# Patient Record
Sex: MALE | Race: WHITE | NOT HISPANIC OR LATINO | Employment: OTHER | URBAN - METROPOLITAN AREA
[De-identification: names, ages, dates, MRNs, and addresses within clinical notes are randomized per-mention and may not be internally consistent; named-entity substitution may affect disease eponyms.]

---

## 2018-01-01 ENCOUNTER — APPOINTMENT (OUTPATIENT)
Dept: RADIOLOGY | Facility: MEDICAL CENTER | Age: 83
DRG: 064 | End: 2018-01-01
Attending: INTERNAL MEDICINE
Payer: MEDICARE

## 2018-01-01 ENCOUNTER — APPOINTMENT (OUTPATIENT)
Dept: RADIOLOGY | Facility: MEDICAL CENTER | Age: 83
DRG: 064 | End: 2018-01-01
Attending: EMERGENCY MEDICINE
Payer: MEDICARE

## 2018-01-01 ENCOUNTER — HOSPICE ADMISSION (OUTPATIENT)
Dept: HOSPICE | Facility: HOSPICE | Age: 83
End: 2018-01-01
Payer: MEDICARE

## 2018-01-01 ENCOUNTER — APPOINTMENT (OUTPATIENT)
Dept: RADIOLOGY | Facility: MEDICAL CENTER | Age: 83
DRG: 064 | End: 2018-01-01
Attending: HOSPITALIST
Payer: MEDICARE

## 2018-01-01 ENCOUNTER — RESOLUTE PROFESSIONAL BILLING HOSPITAL PROF FEE (OUTPATIENT)
Dept: HOSPITALIST | Facility: MEDICAL CENTER | Age: 83
End: 2018-01-01
Payer: MEDICARE

## 2018-01-01 ENCOUNTER — HOSPITAL ENCOUNTER (INPATIENT)
Facility: MEDICAL CENTER | Age: 83
LOS: 16 days | DRG: 064 | End: 2018-03-11
Attending: EMERGENCY MEDICINE | Admitting: HOSPITALIST
Payer: MEDICARE

## 2018-01-01 VITALS
BODY MASS INDEX: 26.4 KG/M2 | TEMPERATURE: 96.8 F | DIASTOLIC BLOOD PRESSURE: 62 MMHG | WEIGHT: 194.89 LBS | OXYGEN SATURATION: 76 % | RESPIRATION RATE: 18 BRPM | HEIGHT: 72 IN | SYSTOLIC BLOOD PRESSURE: 139 MMHG | HEART RATE: 115 BPM

## 2018-01-01 DIAGNOSIS — I63.00 CEREBROVASCULAR ACCIDENT (CVA) DUE TO THROMBOSIS OF PRECEREBRAL ARTERY (HCC): ICD-10-CM

## 2018-01-01 LAB
ALBUMIN SERPL BCP-MCNC: 3.1 G/DL (ref 3.2–4.9)
ALBUMIN SERPL BCP-MCNC: 3.3 G/DL (ref 3.2–4.9)
ALBUMIN SERPL BCP-MCNC: 3.5 G/DL (ref 3.2–4.9)
ALBUMIN SERPL BCP-MCNC: 4.1 G/DL (ref 3.2–4.9)
ALBUMIN/GLOB SERPL: 0.9 G/DL
ALBUMIN/GLOB SERPL: 1.2 G/DL
ALBUMIN/GLOB SERPL: 1.2 G/DL
ALBUMIN/GLOB SERPL: 1.4 G/DL
ALP SERPL-CCNC: 48 U/L (ref 30–99)
ALP SERPL-CCNC: 56 U/L (ref 30–99)
ALP SERPL-CCNC: 71 U/L (ref 30–99)
ALP SERPL-CCNC: 90 U/L (ref 30–99)
ALT SERPL-CCNC: 11 U/L (ref 2–50)
ALT SERPL-CCNC: 8 U/L (ref 2–50)
ALT SERPL-CCNC: 85 U/L (ref 2–50)
ALT SERPL-CCNC: 9 U/L (ref 2–50)
ANION GAP SERPL CALC-SCNC: 7 MMOL/L (ref 0–11.9)
ANION GAP SERPL CALC-SCNC: 9 MMOL/L (ref 0–11.9)
APPEARANCE UR: ABNORMAL
AST SERPL-CCNC: 17 U/L (ref 12–45)
AST SERPL-CCNC: 27 U/L (ref 12–45)
AST SERPL-CCNC: 35 U/L (ref 12–45)
AST SERPL-CCNC: 73 U/L (ref 12–45)
BACTERIA #/AREA URNS HPF: NEGATIVE /HPF
BACTERIA UR CULT: NORMAL
BASOPHILS # BLD AUTO: 0.2 % (ref 0–1.8)
BASOPHILS # BLD AUTO: 0.2 % (ref 0–1.8)
BASOPHILS # BLD AUTO: 0.3 % (ref 0–1.8)
BASOPHILS # BLD AUTO: 0.5 % (ref 0–1.8)
BASOPHILS # BLD: 0.02 K/UL (ref 0–0.12)
BASOPHILS # BLD: 0.02 K/UL (ref 0–0.12)
BASOPHILS # BLD: 0.03 K/UL (ref 0–0.12)
BASOPHILS # BLD: 0.04 K/UL (ref 0–0.12)
BILIRUB SERPL-MCNC: 0.7 MG/DL (ref 0.1–1.5)
BILIRUB SERPL-MCNC: 0.8 MG/DL (ref 0.1–1.5)
BILIRUB SERPL-MCNC: 0.8 MG/DL (ref 0.1–1.5)
BILIRUB SERPL-MCNC: 1.1 MG/DL (ref 0.1–1.5)
BILIRUB UR QL STRIP.AUTO: NEGATIVE
BUN SERPL-MCNC: 15 MG/DL (ref 8–22)
BUN SERPL-MCNC: 15 MG/DL (ref 8–22)
BUN SERPL-MCNC: 18 MG/DL (ref 8–22)
BUN SERPL-MCNC: 20 MG/DL (ref 8–22)
CALCIUM SERPL-MCNC: 8.7 MG/DL (ref 8.5–10.5)
CALCIUM SERPL-MCNC: 9 MG/DL (ref 8.5–10.5)
CALCIUM SERPL-MCNC: 9.1 MG/DL (ref 8.5–10.5)
CALCIUM SERPL-MCNC: 9.3 MG/DL (ref 8.5–10.5)
CHLORIDE SERPL-SCNC: 102 MMOL/L (ref 96–112)
CHLORIDE SERPL-SCNC: 103 MMOL/L (ref 96–112)
CHLORIDE SERPL-SCNC: 105 MMOL/L (ref 96–112)
CHLORIDE SERPL-SCNC: 106 MMOL/L (ref 96–112)
CHOLEST SERPL-MCNC: 126 MG/DL (ref 100–199)
CK SERPL-CCNC: 229 U/L (ref 0–154)
CO2 SERPL-SCNC: 22 MMOL/L (ref 20–33)
CO2 SERPL-SCNC: 23 MMOL/L (ref 20–33)
CO2 SERPL-SCNC: 24 MMOL/L (ref 20–33)
CO2 SERPL-SCNC: 27 MMOL/L (ref 20–33)
COLOR UR: ABNORMAL
CREAT SERPL-MCNC: 0.7 MG/DL (ref 0.5–1.4)
CREAT SERPL-MCNC: 0.71 MG/DL (ref 0.5–1.4)
CREAT SERPL-MCNC: 0.78 MG/DL (ref 0.5–1.4)
CREAT SERPL-MCNC: 1.14 MG/DL (ref 0.5–1.4)
CRP SERPL HS-MCNC: 20.38 MG/DL (ref 0–0.75)
EOSINOPHIL # BLD AUTO: 0 K/UL (ref 0–0.51)
EOSINOPHIL # BLD AUTO: 0.01 K/UL (ref 0–0.51)
EOSINOPHIL # BLD AUTO: 0.01 K/UL (ref 0–0.51)
EOSINOPHIL # BLD AUTO: 0.09 K/UL (ref 0–0.51)
EOSINOPHIL NFR BLD: 0 % (ref 0–6.9)
EOSINOPHIL NFR BLD: 0.1 % (ref 0–6.9)
EOSINOPHIL NFR BLD: 0.1 % (ref 0–6.9)
EOSINOPHIL NFR BLD: 1.2 % (ref 0–6.9)
EPI CELLS #/AREA URNS HPF: NEGATIVE /HPF
ERYTHROCYTE [DISTWIDTH] IN BLOOD BY AUTOMATED COUNT: 46.3 FL (ref 35.9–50)
ERYTHROCYTE [DISTWIDTH] IN BLOOD BY AUTOMATED COUNT: 47.7 FL (ref 35.9–50)
ERYTHROCYTE [DISTWIDTH] IN BLOOD BY AUTOMATED COUNT: 48.5 FL (ref 35.9–50)
ERYTHROCYTE [DISTWIDTH] IN BLOOD BY AUTOMATED COUNT: 49.6 FL (ref 35.9–50)
EST. AVERAGE GLUCOSE BLD GHB EST-MCNC: 111 MG/DL
GLOBULIN SER CALC-MCNC: 2.8 G/DL (ref 1.9–3.5)
GLOBULIN SER CALC-MCNC: 3 G/DL (ref 1.9–3.5)
GLOBULIN SER CALC-MCNC: 3 G/DL (ref 1.9–3.5)
GLOBULIN SER CALC-MCNC: 3.4 G/DL (ref 1.9–3.5)
GLUCOSE SERPL-MCNC: 100 MG/DL (ref 65–99)
GLUCOSE SERPL-MCNC: 106 MG/DL (ref 65–99)
GLUCOSE SERPL-MCNC: 120 MG/DL (ref 65–99)
GLUCOSE SERPL-MCNC: 142 MG/DL (ref 65–99)
GLUCOSE UR STRIP.AUTO-MCNC: NEGATIVE MG/DL
HBA1C MFR BLD: 5.5 % (ref 0–5.6)
HCT VFR BLD AUTO: 35.9 % (ref 42–52)
HCT VFR BLD AUTO: 36.8 % (ref 42–52)
HCT VFR BLD AUTO: 37.4 % (ref 42–52)
HCT VFR BLD AUTO: 41.5 % (ref 42–52)
HDLC SERPL-MCNC: 43 MG/DL
HGB BLD-MCNC: 12 G/DL (ref 14–18)
HGB BLD-MCNC: 12.3 G/DL (ref 14–18)
HGB BLD-MCNC: 12.5 G/DL (ref 14–18)
HGB BLD-MCNC: 14.2 G/DL (ref 14–18)
HYALINE CASTS #/AREA URNS LPF: ABNORMAL /LPF
IMM GRANULOCYTES # BLD AUTO: 0.02 K/UL (ref 0–0.11)
IMM GRANULOCYTES # BLD AUTO: 0.02 K/UL (ref 0–0.11)
IMM GRANULOCYTES # BLD AUTO: 0.03 K/UL (ref 0–0.11)
IMM GRANULOCYTES # BLD AUTO: 0.03 K/UL (ref 0–0.11)
IMM GRANULOCYTES NFR BLD AUTO: 0.2 % (ref 0–0.9)
IMM GRANULOCYTES NFR BLD AUTO: 0.3 % (ref 0–0.9)
KETONES UR STRIP.AUTO-MCNC: NEGATIVE MG/DL
LACTATE BLD-SCNC: 2.3 MMOL/L (ref 0.5–2)
LDLC SERPL CALC-MCNC: 66 MG/DL
LEUKOCYTE ESTERASE UR QL STRIP.AUTO: ABNORMAL
LV EJECT FRACT  99904: 75
LV EJECT FRACT MOD 2C 99903: 69.17
LV EJECT FRACT MOD 4C 99902: 62.49
LV EJECT FRACT MOD BP 99901: 67.44
LYMPHOCYTES # BLD AUTO: 1.28 K/UL (ref 1–4.8)
LYMPHOCYTES # BLD AUTO: 1.41 K/UL (ref 1–4.8)
LYMPHOCYTES # BLD AUTO: 1.47 K/UL (ref 1–4.8)
LYMPHOCYTES # BLD AUTO: 1.59 K/UL (ref 1–4.8)
LYMPHOCYTES NFR BLD: 14.4 % (ref 22–41)
LYMPHOCYTES NFR BLD: 14.8 % (ref 22–41)
LYMPHOCYTES NFR BLD: 15.9 % (ref 22–41)
LYMPHOCYTES NFR BLD: 19.3 % (ref 22–41)
MAGNESIUM SERPL-MCNC: 1.9 MG/DL (ref 1.5–2.5)
MAGNESIUM SERPL-MCNC: 2 MG/DL (ref 1.5–2.5)
MCH RBC QN AUTO: 32.6 PG (ref 27–33)
MCH RBC QN AUTO: 32.7 PG (ref 27–33)
MCH RBC QN AUTO: 32.8 PG (ref 27–33)
MCH RBC QN AUTO: 33.3 PG (ref 27–33)
MCHC RBC AUTO-ENTMCNC: 33.4 G/DL (ref 33.7–35.3)
MCHC RBC AUTO-ENTMCNC: 34.2 G/DL (ref 33.7–35.3)
MCV RBC AUTO: 97.2 FL (ref 81.4–97.8)
MCV RBC AUTO: 97.6 FL (ref 81.4–97.8)
MCV RBC AUTO: 97.9 FL (ref 81.4–97.8)
MCV RBC AUTO: 98.1 FL (ref 81.4–97.8)
MICRO URNS: ABNORMAL
MONOCYTES # BLD AUTO: 0.7 K/UL (ref 0–0.85)
MONOCYTES # BLD AUTO: 0.72 K/UL (ref 0–0.85)
MONOCYTES # BLD AUTO: 0.89 K/UL (ref 0–0.85)
MONOCYTES # BLD AUTO: 0.95 K/UL (ref 0–0.85)
MONOCYTES NFR BLD AUTO: 10.7 % (ref 0–13.4)
MONOCYTES NFR BLD AUTO: 6.5 % (ref 0–13.4)
MONOCYTES NFR BLD AUTO: 9.6 % (ref 0–13.4)
MONOCYTES NFR BLD AUTO: 9.9 % (ref 0–13.4)
NEUTROPHILS # BLD AUTO: 5.01 K/UL (ref 1.82–7.42)
NEUTROPHILS # BLD AUTO: 6.58 K/UL (ref 1.82–7.42)
NEUTROPHILS # BLD AUTO: 6.84 K/UL (ref 1.82–7.42)
NEUTROPHILS # BLD AUTO: 8.41 K/UL (ref 1.82–7.42)
NEUTROPHILS NFR BLD: 68.8 % (ref 44–72)
NEUTROPHILS NFR BLD: 73.9 % (ref 44–72)
NEUTROPHILS NFR BLD: 74.4 % (ref 44–72)
NEUTROPHILS NFR BLD: 78.1 % (ref 44–72)
NITRITE UR QL STRIP.AUTO: NEGATIVE
NRBC # BLD AUTO: 0 K/UL
NRBC BLD-RTO: 0 /100 WBC
PH UR STRIP.AUTO: 5 [PH]
PHOSPHATE SERPL-MCNC: 3.3 MG/DL (ref 2.5–4.5)
PLATELET # BLD AUTO: 187 K/UL (ref 164–446)
PLATELET # BLD AUTO: 195 K/UL (ref 164–446)
PLATELET # BLD AUTO: 225 K/UL (ref 164–446)
PLATELET # BLD AUTO: 229 K/UL (ref 164–446)
PMV BLD AUTO: 8.7 FL (ref 9–12.9)
PMV BLD AUTO: 8.9 FL (ref 9–12.9)
PMV BLD AUTO: 8.9 FL (ref 9–12.9)
PMV BLD AUTO: 9.3 FL (ref 9–12.9)
POTASSIUM SERPL-SCNC: 3.8 MMOL/L (ref 3.6–5.5)
POTASSIUM SERPL-SCNC: 4 MMOL/L (ref 3.6–5.5)
POTASSIUM SERPL-SCNC: 4 MMOL/L (ref 3.6–5.5)
POTASSIUM SERPL-SCNC: 4.3 MMOL/L (ref 3.6–5.5)
PREALB SERPL-MCNC: 6 MG/DL (ref 18–38)
PROT SERPL-MCNC: 6.1 G/DL (ref 6–8.2)
PROT SERPL-MCNC: 6.5 G/DL (ref 6–8.2)
PROT SERPL-MCNC: 6.5 G/DL (ref 6–8.2)
PROT SERPL-MCNC: 7.1 G/DL (ref 6–8.2)
PROT UR QL STRIP: 300 MG/DL
RBC # BLD AUTO: 3.66 M/UL (ref 4.7–6.1)
RBC # BLD AUTO: 3.77 M/UL (ref 4.7–6.1)
RBC # BLD AUTO: 3.82 M/UL (ref 4.7–6.1)
RBC # BLD AUTO: 4.27 M/UL (ref 4.7–6.1)
RBC # URNS HPF: >150 /HPF
RBC UR QL AUTO: ABNORMAL
SIGNIFICANT IND 70042: NORMAL
SITE SITE: NORMAL
SODIUM SERPL-SCNC: 133 MMOL/L (ref 135–145)
SODIUM SERPL-SCNC: 135 MMOL/L (ref 135–145)
SODIUM SERPL-SCNC: 137 MMOL/L (ref 135–145)
SODIUM SERPL-SCNC: 137 MMOL/L (ref 135–145)
SOURCE SOURCE: NORMAL
SP GR UR STRIP.AUTO: 1.02
TRIGL SERPL-MCNC: 83 MG/DL (ref 0–149)
TSH SERPL DL<=0.005 MIU/L-ACNC: 0.94 UIU/ML (ref 0.38–5.33)
UROBILINOGEN UR STRIP.AUTO-MCNC: 1 MG/DL
WBC # BLD AUTO: 10.8 K/UL (ref 4.8–10.8)
WBC # BLD AUTO: 7.3 K/UL (ref 4.8–10.8)
WBC # BLD AUTO: 8.9 K/UL (ref 4.8–10.8)
WBC # BLD AUTO: 9.3 K/UL (ref 4.8–10.8)
WBC #/AREA URNS HPF: ABNORMAL /HPF

## 2018-01-01 PROCEDURE — 87086 URINE CULTURE/COLONY COUNT: CPT

## 2018-01-01 PROCEDURE — 36415 COLL VENOUS BLD VENIPUNCTURE: CPT

## 2018-01-01 PROCEDURE — A9270 NON-COVERED ITEM OR SERVICE: HCPCS | Performed by: HOSPITALIST

## 2018-01-01 PROCEDURE — 82550 ASSAY OF CK (CPK): CPT

## 2018-01-01 PROCEDURE — 700111 HCHG RX REV CODE 636 W/ 250 OVERRIDE (IP): Performed by: INTERNAL MEDICINE

## 2018-01-01 PROCEDURE — 99232 SBSQ HOSP IP/OBS MODERATE 35: CPT | Performed by: HOSPITALIST

## 2018-01-01 PROCEDURE — 97163 PT EVAL HIGH COMPLEX 45 MIN: CPT

## 2018-01-01 PROCEDURE — 97167 OT EVAL HIGH COMPLEX 60 MIN: CPT

## 2018-01-01 PROCEDURE — 83735 ASSAY OF MAGNESIUM: CPT

## 2018-01-01 PROCEDURE — G8979 MOBILITY GOAL STATUS: HCPCS | Mod: CK

## 2018-01-01 PROCEDURE — 99231 SBSQ HOSP IP/OBS SF/LOW 25: CPT | Performed by: INTERNAL MEDICINE

## 2018-01-01 PROCEDURE — 700105 HCHG RX REV CODE 258: Performed by: NURSE PRACTITIONER

## 2018-01-01 PROCEDURE — 51702 INSERT TEMP BLADDER CATH: CPT

## 2018-01-01 PROCEDURE — 80053 COMPREHEN METABOLIC PANEL: CPT

## 2018-01-01 PROCEDURE — 700102 HCHG RX REV CODE 250 W/ 637 OVERRIDE(OP): Performed by: HOSPITALIST

## 2018-01-01 PROCEDURE — 81001 URINALYSIS AUTO W/SCOPE: CPT

## 2018-01-01 PROCEDURE — 770001 HCHG ROOM/CARE - MED/SURG/GYN PRIV*

## 2018-01-01 PROCEDURE — G8987 SELF CARE CURRENT STATUS: HCPCS | Mod: CN

## 2018-01-01 PROCEDURE — 84100 ASSAY OF PHOSPHORUS: CPT

## 2018-01-01 PROCEDURE — 84443 ASSAY THYROID STIM HORMONE: CPT

## 2018-01-01 PROCEDURE — 99232 SBSQ HOSP IP/OBS MODERATE 35: CPT | Performed by: INTERNAL MEDICINE

## 2018-01-01 PROCEDURE — 99223 1ST HOSP IP/OBS HIGH 75: CPT | Mod: AI | Performed by: HOSPITALIST

## 2018-01-01 PROCEDURE — 83036 HEMOGLOBIN GLYCOSYLATED A1C: CPT

## 2018-01-01 PROCEDURE — 85025 COMPLETE CBC W/AUTO DIFF WBC: CPT

## 2018-01-01 PROCEDURE — 99497 ADVNCD CARE PLAN 30 MIN: CPT | Performed by: INTERNAL MEDICINE

## 2018-01-01 PROCEDURE — 92610 EVALUATE SWALLOWING FUNCTION: CPT

## 2018-01-01 PROCEDURE — 93306 TTE W/DOPPLER COMPLETE: CPT | Mod: 26 | Performed by: INTERNAL MEDICINE

## 2018-01-01 PROCEDURE — 95951 EEG: CPT | Mod: 52 | Performed by: PSYCHIATRY & NEUROLOGY

## 2018-01-01 PROCEDURE — 84134 ASSAY OF PREALBUMIN: CPT

## 2018-01-01 PROCEDURE — 770020 HCHG ROOM/CARE - TELE (206)

## 2018-01-01 PROCEDURE — 302255 BARRIER CREAM MOISTURE BAZA PROTECT (ZINC) 5OZ: Performed by: HOSPITALIST

## 2018-01-01 PROCEDURE — 302128 INFUSION PUMP W/POLE: Performed by: HOSPITALIST

## 2018-01-01 PROCEDURE — 302146: Performed by: HOSPITALIST

## 2018-01-01 PROCEDURE — A4357 BEDSIDE DRAINAGE BAG: HCPCS | Performed by: HOSPITALIST

## 2018-01-01 PROCEDURE — 71045 X-RAY EXAM CHEST 1 VIEW: CPT

## 2018-01-01 PROCEDURE — 3E0G76Z INTRODUCTION OF NUTRITIONAL SUBSTANCE INTO UPPER GI, VIA NATURAL OR ARTIFICIAL OPENING: ICD-10-PCS | Performed by: HOSPITALIST

## 2018-01-01 PROCEDURE — G8996 SWALLOW CURRENT STATUS: HCPCS | Mod: CN

## 2018-01-01 PROCEDURE — 99231 SBSQ HOSP IP/OBS SF/LOW 25: CPT | Performed by: HOSPITALIST

## 2018-01-01 PROCEDURE — 83605 ASSAY OF LACTIC ACID: CPT

## 2018-01-01 PROCEDURE — 86140 C-REACTIVE PROTEIN: CPT

## 2018-01-01 PROCEDURE — 304561 HCHG STAT O2

## 2018-01-01 PROCEDURE — 93306 TTE W/DOPPLER COMPLETE: CPT

## 2018-01-01 PROCEDURE — G8989 SELF CARE D/C STATUS: HCPCS | Mod: CN

## 2018-01-01 PROCEDURE — G8988 SELF CARE GOAL STATUS: HCPCS | Mod: CK

## 2018-01-01 PROCEDURE — 93880 EXTRACRANIAL BILAT STUDY: CPT | Mod: 26 | Performed by: SURGERY

## 2018-01-01 PROCEDURE — 99233 SBSQ HOSP IP/OBS HIGH 50: CPT | Mod: 25 | Performed by: INTERNAL MEDICINE

## 2018-01-01 PROCEDURE — 99285 EMERGENCY DEPT VISIT HI MDM: CPT

## 2018-01-01 PROCEDURE — 80061 LIPID PANEL: CPT

## 2018-01-01 PROCEDURE — G8980 MOBILITY D/C STATUS: HCPCS | Mod: CN

## 2018-01-01 PROCEDURE — 92523 SPEECH SOUND LANG COMPREHEN: CPT

## 2018-01-01 PROCEDURE — G8978 MOBILITY CURRENT STATUS: HCPCS | Mod: CN

## 2018-01-01 PROCEDURE — G8997 SWALLOW GOAL STATUS: HCPCS | Mod: CM

## 2018-01-01 PROCEDURE — 93880 EXTRACRANIAL BILAT STUDY: CPT

## 2018-01-01 PROCEDURE — 70551 MRI BRAIN STEM W/O DYE: CPT

## 2018-01-01 PROCEDURE — 303105 HCHG CATHETER EXTRA

## 2018-01-01 PROCEDURE — 51798 US URINE CAPACITY MEASURE: CPT

## 2018-01-01 RX ORDER — MORPHINE SULFATE 10 MG/ML
10 INJECTION, SOLUTION INTRAMUSCULAR; INTRAVENOUS
Status: DISCONTINUED | OUTPATIENT
Start: 2018-01-01 | End: 2018-01-01 | Stop reason: HOSPADM

## 2018-01-01 RX ORDER — AMOXICILLIN 250 MG
2 CAPSULE ORAL 2 TIMES DAILY
Status: DISCONTINUED | OUTPATIENT
Start: 2018-01-01 | End: 2018-01-01

## 2018-01-01 RX ORDER — SODIUM CHLORIDE 9 MG/ML
INJECTION, SOLUTION INTRAVENOUS CONTINUOUS
Status: DISCONTINUED | OUTPATIENT
Start: 2018-01-01 | End: 2018-01-01

## 2018-01-01 RX ORDER — LOSARTAN POTASSIUM 50 MG/1
100 TABLET ORAL DAILY
Status: ON HOLD | COMMUNITY
End: 2018-01-01

## 2018-01-01 RX ORDER — ASPIRIN 325 MG
325 TABLET ORAL DAILY
Status: DISCONTINUED | OUTPATIENT
Start: 2018-01-01 | End: 2018-01-01

## 2018-01-01 RX ORDER — BISACODYL 10 MG
10 SUPPOSITORY, RECTAL RECTAL
Status: DISCONTINUED | OUTPATIENT
Start: 2018-01-01 | End: 2018-01-01

## 2018-01-01 RX ORDER — ATORVASTATIN CALCIUM 20 MG/1
20 TABLET, FILM COATED ORAL NIGHTLY
Status: ON HOLD | COMMUNITY
End: 2018-01-01

## 2018-01-01 RX ORDER — ATORVASTATIN CALCIUM 40 MG/1
40 TABLET, FILM COATED ORAL EVERY EVENING
Status: DISCONTINUED | OUTPATIENT
Start: 2018-01-01 | End: 2018-01-01

## 2018-01-01 RX ORDER — POLYVINYL ALCOHOL 14 MG/ML
2 SOLUTION/ DROPS OPHTHALMIC EVERY 6 HOURS PRN
Status: DISCONTINUED | OUTPATIENT
Start: 2018-01-01 | End: 2018-01-01 | Stop reason: HOSPADM

## 2018-01-01 RX ORDER — ATORVASTATIN CALCIUM 20 MG/1
20 TABLET, FILM COATED ORAL
Status: DISCONTINUED | OUTPATIENT
Start: 2018-01-01 | End: 2018-01-01

## 2018-01-01 RX ORDER — CARVEDILOL 6.25 MG/1
3.12 TABLET ORAL 2 TIMES DAILY WITH MEALS
Status: DISCONTINUED | OUTPATIENT
Start: 2018-01-01 | End: 2018-01-01

## 2018-01-01 RX ORDER — HYDRALAZINE HYDROCHLORIDE 20 MG/ML
10 INJECTION INTRAMUSCULAR; INTRAVENOUS
Status: DISCONTINUED | OUTPATIENT
Start: 2018-01-01 | End: 2018-01-01

## 2018-01-01 RX ORDER — MORPHINE SULFATE 10 MG/ML
5 INJECTION, SOLUTION INTRAMUSCULAR; INTRAVENOUS
Status: DISCONTINUED | OUTPATIENT
Start: 2018-01-01 | End: 2018-01-01 | Stop reason: HOSPADM

## 2018-01-01 RX ORDER — LABETALOL HYDROCHLORIDE 5 MG/ML
10 INJECTION, SOLUTION INTRAVENOUS EVERY 4 HOURS PRN
Status: DISCONTINUED | OUTPATIENT
Start: 2018-01-01 | End: 2018-01-01

## 2018-01-01 RX ORDER — CARVEDILOL 3.12 MG/1
3.12 TABLET ORAL 2 TIMES DAILY WITH MEALS
COMMUNITY

## 2018-01-01 RX ORDER — ASPIRIN 81 MG/1
324 TABLET, CHEWABLE ORAL DAILY
Status: DISCONTINUED | OUTPATIENT
Start: 2018-01-01 | End: 2018-01-01

## 2018-01-01 RX ORDER — MORPHINE SULFATE 100 MG/5ML
10 SOLUTION ORAL
Status: DISCONTINUED | OUTPATIENT
Start: 2018-01-01 | End: 2018-01-01 | Stop reason: HOSPADM

## 2018-01-01 RX ORDER — LORAZEPAM 2 MG/ML
1 INJECTION INTRAMUSCULAR
Status: DISCONTINUED | OUTPATIENT
Start: 2018-01-01 | End: 2018-01-01 | Stop reason: HOSPADM

## 2018-01-01 RX ORDER — ASPIRIN 300 MG/1
300 SUPPOSITORY RECTAL DAILY
Status: DISCONTINUED | OUTPATIENT
Start: 2018-01-01 | End: 2018-01-01

## 2018-01-01 RX ORDER — POLYETHYLENE GLYCOL 3350 17 G/17G
1 POWDER, FOR SOLUTION ORAL
Status: DISCONTINUED | OUTPATIENT
Start: 2018-01-01 | End: 2018-01-01

## 2018-01-01 RX ORDER — LORAZEPAM 2 MG/ML
1 CONCENTRATE ORAL
Status: DISCONTINUED | OUTPATIENT
Start: 2018-01-01 | End: 2018-01-01 | Stop reason: HOSPADM

## 2018-01-01 RX ORDER — ATROPINE SULFATE 10 MG/ML
2 SOLUTION/ DROPS OPHTHALMIC EVERY 4 HOURS PRN
Status: DISCONTINUED | OUTPATIENT
Start: 2018-01-01 | End: 2018-01-01

## 2018-01-01 RX ORDER — LOSARTAN POTASSIUM 100 MG/1
100 TABLET ORAL DAILY
COMMUNITY

## 2018-01-01 RX ORDER — LOSARTAN POTASSIUM 50 MG/1
100 TABLET ORAL
Status: DISCONTINUED | OUTPATIENT
Start: 2018-01-01 | End: 2018-01-01

## 2018-01-01 RX ADMIN — STANDARDIZED SENNA CONCENTRATE AND DOCUSATE SODIUM 2 TABLET: 8.6; 5 TABLET, FILM COATED ORAL at 21:04

## 2018-01-01 RX ADMIN — SODIUM CHLORIDE: 9 INJECTION, SOLUTION INTRAVENOUS at 17:42

## 2018-01-01 RX ADMIN — ASPIRIN 324 MG: 81 TABLET, CHEWABLE ORAL at 07:54

## 2018-01-01 RX ADMIN — ATORVASTATIN CALCIUM 40 MG: 40 TABLET, FILM COATED ORAL at 21:04

## 2018-01-01 RX ADMIN — ATORVASTATIN CALCIUM 40 MG: 40 TABLET, FILM COATED ORAL at 20:18

## 2018-01-01 RX ADMIN — CARVEDILOL 3.12 MG: 6.25 TABLET, FILM COATED ORAL at 09:13

## 2018-01-01 RX ADMIN — SODIUM CHLORIDE: 9 INJECTION, SOLUTION INTRAVENOUS at 10:53

## 2018-01-01 RX ADMIN — CARVEDILOL 3.12 MG: 6.25 TABLET, FILM COATED ORAL at 10:12

## 2018-01-01 RX ADMIN — ASPIRIN 300 MG: 300 SUPPOSITORY RECTAL at 10:04

## 2018-01-01 RX ADMIN — LORAZEPAM 1 MG: 2 INJECTION INTRAMUSCULAR; INTRAVENOUS at 23:26

## 2018-01-01 RX ADMIN — MORPHINE SULFATE 10 MG: 10 INJECTION INTRAVENOUS at 23:26

## 2018-01-01 RX ADMIN — ATORVASTATIN CALCIUM 40 MG: 40 TABLET, FILM COATED ORAL at 21:16

## 2018-01-01 RX ADMIN — MORPHINE SULFATE 5 MG: 10 INJECTION INTRAVENOUS at 22:58

## 2018-01-01 RX ADMIN — CARVEDILOL 3.12 MG: 6.25 TABLET, FILM COATED ORAL at 07:54

## 2018-01-01 RX ADMIN — MORPHINE SULFATE 10 MG: 10 INJECTION INTRAVENOUS at 03:39

## 2018-01-01 RX ADMIN — CARVEDILOL 3.12 MG: 6.25 TABLET, FILM COATED ORAL at 18:20

## 2018-01-01 RX ADMIN — LOSARTAN POTASSIUM 100 MG: 50 TABLET, FILM COATED ORAL at 09:14

## 2018-01-01 RX ADMIN — SODIUM CHLORIDE: 9 INJECTION, SOLUTION INTRAVENOUS at 15:59

## 2018-01-01 RX ADMIN — LOSARTAN POTASSIUM 100 MG: 50 TABLET, FILM COATED ORAL at 08:42

## 2018-01-01 RX ADMIN — MORPHINE SULFATE 5 MG: 10 INJECTION INTRAVENOUS at 13:08

## 2018-01-01 RX ADMIN — LOSARTAN POTASSIUM 100 MG: 50 TABLET, FILM COATED ORAL at 10:12

## 2018-01-01 RX ADMIN — CARVEDILOL 3.12 MG: 6.25 TABLET, FILM COATED ORAL at 17:24

## 2018-01-01 RX ADMIN — LOSARTAN POTASSIUM 100 MG: 50 TABLET, FILM COATED ORAL at 07:54

## 2018-01-01 RX ADMIN — MORPHINE SULFATE 5 MG: 10 INJECTION INTRAVENOUS at 22:39

## 2018-01-01 RX ADMIN — ASPIRIN 324 MG: 81 TABLET, CHEWABLE ORAL at 07:40

## 2018-01-01 RX ADMIN — CARVEDILOL 3.12 MG: 6.25 TABLET, FILM COATED ORAL at 17:39

## 2018-01-01 RX ADMIN — MORPHINE SULFATE 5 MG: 10 INJECTION INTRAVENOUS at 17:30

## 2018-01-01 RX ADMIN — STANDARDIZED SENNA CONCENTRATE AND DOCUSATE SODIUM 2 TABLET: 8.6; 5 TABLET, FILM COATED ORAL at 07:40

## 2018-01-01 RX ADMIN — LOSARTAN POTASSIUM 100 MG: 50 TABLET, FILM COATED ORAL at 07:40

## 2018-01-01 RX ADMIN — STANDARDIZED SENNA CONCENTRATE AND DOCUSATE SODIUM 2 TABLET: 8.6; 5 TABLET, FILM COATED ORAL at 08:43

## 2018-01-01 RX ADMIN — STANDARDIZED SENNA CONCENTRATE AND DOCUSATE SODIUM 2 TABLET: 8.6; 5 TABLET, FILM COATED ORAL at 10:12

## 2018-01-01 RX ADMIN — LORAZEPAM 1 MG: 2 INJECTION INTRAMUSCULAR; INTRAVENOUS at 03:39

## 2018-01-01 RX ADMIN — MORPHINE SULFATE 5 MG: 10 INJECTION INTRAVENOUS at 15:35

## 2018-01-01 RX ADMIN — CARVEDILOL 3.12 MG: 6.25 TABLET, FILM COATED ORAL at 08:42

## 2018-01-01 RX ADMIN — ASPIRIN 324 MG: 81 TABLET, CHEWABLE ORAL at 09:13

## 2018-01-01 RX ADMIN — SODIUM CHLORIDE: 9 INJECTION, SOLUTION INTRAVENOUS at 08:01

## 2018-01-01 RX ADMIN — ASPIRIN 324 MG: 81 TABLET, CHEWABLE ORAL at 10:12

## 2018-01-01 RX ADMIN — ATORVASTATIN CALCIUM 40 MG: 40 TABLET, FILM COATED ORAL at 20:17

## 2018-01-01 RX ADMIN — CARVEDILOL 3.12 MG: 6.25 TABLET, FILM COATED ORAL at 17:36

## 2018-01-01 RX ADMIN — STANDARDIZED SENNA CONCENTRATE AND DOCUSATE SODIUM 2 TABLET: 8.6; 5 TABLET, FILM COATED ORAL at 07:54

## 2018-01-01 RX ADMIN — CARVEDILOL 3.12 MG: 6.25 TABLET, FILM COATED ORAL at 07:41

## 2018-01-01 RX ADMIN — ASPIRIN 325 MG: 325 TABLET ORAL at 08:43

## 2018-01-01 RX ADMIN — STANDARDIZED SENNA CONCENTRATE AND DOCUSATE SODIUM 2 TABLET: 8.6; 5 TABLET, FILM COATED ORAL at 20:17

## 2018-01-01 ASSESSMENT — ACTIVITIES OF DAILY LIVING (ADL): TOILETING: INDEPENDENT

## 2018-01-01 ASSESSMENT — COGNITIVE AND FUNCTIONAL STATUS - GENERAL
DRESSING REGULAR LOWER BODY CLOTHING: TOTAL
MOVING FROM LYING ON BACK TO SITTING ON SIDE OF FLAT BED: UNABLE
TURNING FROM BACK TO SIDE WHILE IN FLAT BAD: UNABLE
HELP NEEDED FOR BATHING: TOTAL
MOVING FROM LYING ON BACK TO SITTING ON SIDE OF FLAT BED: UNABLE
EATING MEALS: TOTAL
MOBILITY SCORE: 6
CLIMB 3 TO 5 STEPS WITH RAILING: TOTAL
CLIMB 3 TO 5 STEPS WITH RAILING: TOTAL
MOVING TO AND FROM BED TO CHAIR: UNABLE
DAILY ACTIVITIY SCORE: 6
SUGGESTED CMS G CODE MODIFIER DAILY ACTIVITY: CN
PERSONAL GROOMING: TOTAL
STANDING UP FROM CHAIR USING ARMS: TOTAL
DAILY ACTIVITIY SCORE: 6
SUGGESTED CMS G CODE MODIFIER MOBILITY: CN
SUGGESTED CMS G CODE MODIFIER DAILY ACTIVITY: CN
WALKING IN HOSPITAL ROOM: TOTAL
PERSONAL GROOMING: TOTAL
WALKING IN HOSPITAL ROOM: TOTAL
EATING MEALS: TOTAL
TOILETING: TOTAL
DRESSING REGULAR UPPER BODY CLOTHING: TOTAL
MOVING TO AND FROM BED TO CHAIR: UNABLE
SUGGESTED CMS G CODE MODIFIER MOBILITY: CN
HELP NEEDED FOR BATHING: TOTAL
DRESSING REGULAR UPPER BODY CLOTHING: TOTAL
MOBILITY SCORE: 6
DRESSING REGULAR LOWER BODY CLOTHING: TOTAL
STANDING UP FROM CHAIR USING ARMS: TOTAL
TOILETING: TOTAL
TURNING FROM BACK TO SIDE WHILE IN FLAT BAD: UNABLE

## 2018-01-01 ASSESSMENT — ENCOUNTER SYMPTOMS
BACK PAIN: 0
WEAKNESS: 1
BACK PAIN: 0
SPEECH CHANGE: 1
WEAKNESS: 1
EYE REDNESS: 1
EYE REDNESS: 1
BLOOD IN STOOL: 0
SPEECH CHANGE: 1
BLOOD IN STOOL: 0

## 2018-01-01 ASSESSMENT — PAIN SCALES - GENERAL
PAINLEVEL_OUTOF10: 0
PAINLEVEL_OUTOF10: 0
PAINLEVEL_OUTOF10: ASSUMED PAIN PRESENT
PAINLEVEL_OUTOF10: 0
PAINLEVEL_OUTOF10: 0
PAINLEVEL_OUTOF10: ASSUMED PAIN PRESENT
PAINLEVEL_OUTOF10: 0
PAINLEVEL_OUTOF10: ASSUMED PAIN PRESENT

## 2018-01-01 ASSESSMENT — GAIT ASSESSMENTS: GAIT LEVEL OF ASSIST: UNABLE TO PARTICIPATE

## 2018-02-23 PROBLEM — I63.9 STROKE (HCC): Status: ACTIVE | Noted: 2018-01-01

## 2018-02-23 NOTE — ED PROVIDER NOTES
ED Provider Note    CHIEF COMPLAINT  Chief Complaint   Patient presents with   • Possible Stroke     82 yo male bib [facility] as a transfer from [facility]. Per EMS, pt was last since at baseline at 11am by daughter, then was found to have neuro changes at 3pm and was brought to the hospital. Pt has hx of 2 strokes, hypertension, COPD. Pt given 1 duoneb in flight for bilateral crackles and expiratory wheezes. Pt on 3 L nasal cannula.  Pt unable to speak or follow commands at this time. Pt has stringer cath to down drain.       HPI  Nick Billings is a 83 y.o. male who presents as a transfer after the patient suffered from a suspected stroke. The patient does not speak there for all of his history was from his records from [facility]. According to the records the patient was last seen normal around 11 AM. The patient's daughter found him at noon with inability to speak and in distress. He is transferred to [facility] where he had a CT scan performed that showed a subacute stroke in the left parietal and temporal lobes. The patient was therefore transferred here for higher level of care. No other history could be obtained.    REVIEW OF SYSTEMS  See HPI for further details. Unobtainable.     PAST MEDICAL HISTORY  Past Medical History:   Diagnosis Date   • Chronic obstructive pulmonary disease    • Injury of upper arm, superficial, infected 2005    cellulits   • Prostate cancer (CMS-[facility])        SOCIAL HISTORY  Social History     Social History   • Marital status: Single     Spouse name: N/A   • Number of children: N/A   • Years of education: N/A     Social History Main Topics   • Smoking status: Current Every Day Smoker     Packs/day: 1.50     Types: Cigarettes   • Smokeless tobacco: Never Used   • Alcohol use Yes      Comment: 3-6 beers daily   • Drug use: No   • Sexual activity: Not on file     Other Topics Concern   • Not on file     Social History Narrative   • No  narrative on file           PHYSICAL EXAM  VITAL SIGNS: /86   Pulse (!) 109   Temp 37.4 °C (99.3 °F)   Resp 20   Ht 1.829 m (6')   Wt 95.7 kg (211 lb)   SpO2 93%   BMI 28.62 kg/m²   Constitutional: in acute distress, Non-toxic appearance.   HENT: Normocephalic, Atraumatic, tympanic membranes are intact and nonerythematous bilaterally, Oropharynx dry without exudates or erythema, Nose normal.   Eyes: PERRLA, the patient will not follow commands to test extraocular motor function.  Neck: Supple without meningismus  Lymphatic: No lymphadenopathy noted.   Cardiovascular: Slightly tachycardic heart rate, Normal rhythm, No murmurs, No rubs, No gallops.   Thorax & Lungs: Symmetrically diminished throughout, No respiratory distress, No wheezing, No chest tenderness.   Abdomen: Bowel sounds normal, Soft, No tenderness, no rebound, no guarding, no distention, No masses, No pulsatile masses.   Skin: Warm, Dry, No erythema, No rash.   Back: No tenderness, No CVA tenderness.   Extremities: Atraumatic with symmetric distal pulses, No edema, No tenderness, No cyanosis, No clubbing.   Neurologic: The patient seems to be alert but he will not speak nor answer questions, he will open his eyes but will not follow commands, he does withdrawal to pain to all 4 extremities and seems to be less brisk with redrawn with the left upper extremity. I cannot test sensory function.    COURSE & MEDICAL DECISION MAKING  Pertinent Labs & Imaging studies reviewed. (See chart for details)  This an 83-year-old gentleman who presents to the emergency department as a transfer after a cerebrovascular accident. The patient is outside of the window for any type of thrombolytic. There for the patient be admitted for medical management. I did review his workup including a CT scan, laboratory analysis, and EKG. The patient also presents with a durable power of . The patient does not want to be intubated or aggressively resuscitated if he  does not have a reversible condition. Based on this ischemic event I do not suspect this will be reversible. We'll keep the patient comfortable and we will not intubate the patient and he'll be admitted for observation and medical treatment of the suspected CVA.    FINAL IMPRESSION  1. Acute CVA     Disposition  The patient will be admitted in critical condition    Electronically signed by: Harjeet Berrios, 2/23/2018 1:40 AM

## 2018-02-23 NOTE — THERAPY
"Speech Language Therapy Evaluation completed to address speech, communication and cognition  Functional Status:  Limited cognitive-linguistic asessment completed due to severity of patient's aphasia. Patient was able to open eyes to verbal and tactile stimuli. However, he was unable to follow commands or indicate yes/no response with gestures, eye blink, or eye gaze. Patient presents with profound global aphasia. Auditory comprhension, verbal expression, nonverbal expression, and cognition appear profoundly impaired. Patient is unable to communicate basic wants and needs at this time.  Recommendations:  24 hour supervision/assistance with all ADLs; Speech therapy for low-level non-verbal communication, auditory comprehension, and cognition  Plan of Care: Will benefit from Speech Therapy 3 times per week  Post-Acute Therapy: Discharge to a transitional care facility for continued skilled therapy services.    See \"Rehab Therapy-Acute\" Patient Summary Report for complete documentation.   "

## 2018-02-23 NOTE — PROGRESS NOTES
Assumed care of patient.  Pt is non-verbal with severe expressive aphasia.  Pt does not follow commands.  Pt is NPO.  Tele in place.  SCDs in place. Q2Hr turns in place.  Barnett in place.  Call light within reach. Hourly rounding in place.

## 2018-02-23 NOTE — ASSESSMENT & PLAN NOTE
Discontinue blood pressure medications. Stop monitoring blood pressure  Patient is comfort care now

## 2018-02-23 NOTE — H&P
" Hospital Medicine History and Physical    Date of Service  2/23/2018    Chief Complaint  Chief Complaint   Patient presents with   • Possible Stroke     84 yo male bib [facility] as a transfer from [facility]. Per EMS, pt was last since at baseline at 11am by daughter, then was found to have neuro changes at 3pm and was brought to the hospital. Pt has hx of 2 strokes, hypertension, COPD. Pt given 1 duoneb in flight for bilateral crackles and expiratory wheezes. Pt on 3 L nasal cannula.  Pt unable to speak or follow commands at this time. Pt has stringer cath to down drain.       History of Presenting Illness  83 y.o. male who presented 2/23/2018 with transfer the patient suffered a suspected stroke. All history has to be obtained for [facility] record secondary to patient being nonverbal. There is limited history. Seen normal around 11 AM daughter found him with inability to speak and in distress. Transferred to [facility] where he had a CT scan performed that showed a subacute stroke in the left parietal and temporal lobes. Therefore transferred here for higher level of care.    Primary Care Physician  Maryann Sequeira, P.A.    Consultants  None    Code Status  DNR    Review of Systems  Review of Systems   Unable to perform ROS: Medical condition        Past Medical History  Past Medical History:   Diagnosis Date   • Injury of upper arm, superficial, infected 2005    cellulits   • Chronic obstructive pulmonary disease    • Prostate cancer (CMS-[facility])        Surgical History  Past Surgical History:   Procedure Laterality Date   • CLAVICLE ORIF     • KNEE ARTHROSCOPY         Medications  No current facility-administered medications on file prior to encounter.      No current outpatient prescriptions on file prior to encounter.       Family History  Family History   Problem Relation Age of Onset   • Heart Disease Father      \"heart attack\"       Social History  Social History "   Substance Use Topics   • Smoking status: Current Every Day Smoker     Packs/day: 1.50     Types: Cigarettes   • Smokeless tobacco: Never Used   • Alcohol use Yes      Comment: 3-6 beers daily       Allergies  No Known Allergies     Physical Exam  Laboratory   Hemodynamics  Temp (24hrs), Av.4 °C (99.3 °F), Min:37.4 °C (99.3 °F), Max:37.4 °C (99.3 °F)   Temperature: 37.4 °C (99.3 °F)  Pulse  Av  Min: 109  Max: 109 Heart Rate (Monitored): (!) 108  Blood Pressure : 142/86, NIBP: 136/59      Respiratory      Respiration: 20, Pulse Oximetry: 93 %        RUL Breath Sounds: Expiratory Wheezes, RML Breath Sounds: Coarse Crackles, RLL Breath Sounds: Coarse Crackles, MIRYAM Breath Sounds: Expiratory Wheezes, LLL Breath Sounds: Coarse Crackles    Physical Exam   Constitutional: He appears well-developed and well-nourished.   HENT:   Head: Normocephalic and atraumatic.   Eyes: Conjunctivae are normal. Pupils are equal, round, and reactive to light.   Neck: Normal range of motion. Neck supple. No JVD present.   Cardiovascular: Normal rate, regular rhythm, normal heart sounds and intact distal pulses.    No murmur heard.  Pulmonary/Chest: Effort normal and breath sounds normal. No respiratory distress. He exhibits no tenderness.   Abdominal: Soft. Bowel sounds are normal. He exhibits no distension. There is no tenderness.   Musculoskeletal: Normal range of motion. He exhibits no edema.   Neurological:   The patient seems to be alert but he will not speak nor answer questions, he will open his eyes but will not follow commands, he does withdrawal to pain to all 4 extremities and seems to be less brisk with redrawn with the left upper extremity. I cannot test sensory function.   Skin: Skin is warm and dry. No erythema.   Psychiatric: He has a normal mood and affect. His behavior is normal. Judgment and thought content normal.   Nursing note and vitals reviewed.              No results for input(s): ALTSGPT, ASTSGOT,  ALKPHOSPHAT, TBILIRUBIN, DBILIRUBIN, GAMMAGT, AMYLASE, LIPASE, ALB, PREALBUMIN, GLUCOSE in the last 72 hours.              No results found for: TROPONINI  Urinalysis:  No results found for: SPECGRAVITY, GLUCOSEUR, KETONES, NITRITE, WBCURINE, RBCURINE, BACTERIA, EPITHELCELL     Imaging  reviewed   Assessment/Plan     I anticipate this patient will require at least two midnights for appropriate medical management, necessitating inpatient admission.    Stroke (CMS-HCC)   Assessment & Plan    CT with left sided parietal and temporal lobe stroke  Patient with very poor prognosis for recover discussed case with patients daughter she wants to pursue further treatment as she believes this is what his wishes would be   I have ordered MRI, Echo, carotids   Lipid panel a1c   Statin, asa  PT/OT/ NPO until speech  Needs neurology consult   Will also consult palliative care  Patients POA daughter to be present in the morning        COPD (chronic obstructive pulmonary disease) (CMS-HCC)- (present on admission)   Assessment & Plan    Hx of not in acute exacerbation currently  resp care protocol ordered        Essential hypertension, benign- (present on admission)   Assessment & Plan    Hx of allow for permissive            VTE prophylaxis: scd

## 2018-02-23 NOTE — PROGRESS NOTES
2 RN skin assessment    Buttocks redness blanchable.    Otherwise skin intact.  No open wounds,drainage, or signs of infection.

## 2018-02-23 NOTE — PROGRESS NOTES
Assumed care of pt. Non verbal, does not follow commands. # Lo2 via NC,  on. Anibal changed per protocol. NPO per SLp, recommend cortrak, however waiting for family input. IV fluids ordered. MRI, ECHO, Carotid, PT, OT pending. POC undetermined at this time. Bed alarm on. Q2 turns. Call light in reach, bed in low position, will continue hourly rounding.

## 2018-02-23 NOTE — ED NOTES
X-ray at bedside completing chest xray. Pt's head of bed elevated. Pt appears to be clearing secretions at this time, coughing occasionally. VSS.

## 2018-02-23 NOTE — THERAPY
"Occupational Therapy Evaluation completed.   Functional Status: Pt is an 84 y/o male admitted with L CVA. Pt lethargic. Appears to track to auditory cues but he is inconsistent. He requires totalA x2 for bed mobility. Trace-dependent sitting balance eob, no righting reactions present. Pt did appear to be gripping therapists hands with digits 3-5 on each hand but unsure if on purpose. RUE with tone when ranging. Pt is currently requiring totalA for self cares at this time. Limited by weakness, fatigue, impaired balance, and impaired cognition which impacts independence in ADLs and functional mobility.   Plan of Care: Will benefit from Occupational Therapy 3 times per week  Discharge Recommendations:  Equipment: Will Continue to Assess for Equipment Needs. Discharge to a transitional care facility for continued skilled therapy services.    See \"Rehab Therapy-Acute\" Patient Summary Report for complete documentation.    "

## 2018-02-23 NOTE — ED TRIAGE NOTES
Chief Complaint   Patient presents with   • Possible Stroke     82 yo male bib [facility] as a transfer from [facility]. Per EMS, pt was last since at baseline at 11am by daughter, then was found to have neuro changes at 3pm and was brought to the hospital. Pt has hx of 2 strokes, hypertension, COPD. Pt given 1 duoneb in flight for bilateral crackles and expiratory wheezes. Pt on 3 L nasal cannula.  Pt unable to speak or follow commands at this time. Pt has stringer cath to down drain.     NIH stroke scale completed, score of 31, GCS of 6. Pt non-verbal at this time, opening eyes spontaneously but does not follow commands. Pt withdraws to pain in all extremities. Pt's response in right side appears to be stronger then left side. ERP to see.

## 2018-02-23 NOTE — CARE PLAN
Problem: Respiratory:  Goal: Respiratory status will improve    Intervention: Administer and titrate oxygen therapy  3 L O2 via NC,   In use      Problem: Skin Integrity  Goal: Risk for impaired skin integrity will decrease    Intervention: Assess risk factors for impaired skin integrity and/or pressure ulcers  Q2 turns in place

## 2018-02-23 NOTE — ASSESSMENT & PLAN NOTE
CT with left sided parietal and temporal lobe stroke  On comfort care  Awaiting acceptance with Tata botello

## 2018-02-23 NOTE — CARE PLAN
Problem: Venous Thromboembolism (VTW)/Deep Vein Thrombosis (DVT) Prevention:  Goal: Patient will participate in Venous Thrombosis (VTE)/Deep Vein Thrombosis (DVT)Prevention Measures  Outcome: PROGRESSING AS EXPECTED  SCDs in place

## 2018-02-23 NOTE — THERAPY
"  Speech Language Therapy Clinical Swallow Evaluation completed.  Functional Status: Bedside swallow evaluation completed today. Patient awake, but unable to follow simple 1-step commands or answer yes/no questions with gestures or eye blinks/gaze. Patient presents with symptoms of severe oral-pharyngeal dysphagia. Specifically, absent swallow trigger. Ice chip trials resulted in absent swallow with coughing. Unable to complete oral motor examination due to patient's severe aphasia. At this time, recommend NPO with alternate nutrition source as risk of aspiration is very high.   Recommendations - Diet: Diet / Liquid Recommendation: NPO, Pre-Feeding Trials with SLP Only                          Strategies: To Be Assessed                          Medication Administration: Medication Administration : Via Gastric Tube  Plan of Care: Will benefit from Speech Therapy 3 times per week  Post-Acute Therapy: Discharge to a transitional care facility for continued skilled therapy services.    See \"Rehab Therapy-Acute\" Patient Summary Report for complete documentation.   "

## 2018-02-24 NOTE — CONSULTS
DATE OF SERVICE:  02/23/2018    REQUESTING PHYSICIAN:  Jamel Shook MD    REASON FOR CONSULTATION:  Stroke.    HISTORY OF PRESENT ILLNESS:  The patient is an 83-year-old male with past   medical history significant for previous stroke with unknown residual symptoms   and history of chronic obstructive pulmonary disease and prostate cancer who   was transferred from an outside facility for evaluation of aphasia and   right-sided weakness.  Unfortunately, there is no family around and the   patient is nonverbal and I was not able to get a good history from him.    However, I discussed the case with Dr. Shook who reviewed his medical   record at the time of transfer and it appears he was seen normal yesterday   morning around 11:00 a.m. and was later on at 3:00 p.m. noted to have   difficulty talking with right-sided weakness.  He was initially taken to [facility] where he underwent a brain CT, which revealed evidence of   subacute infarct in the distribution of the left MCA.  The patient was found   not to be a candidate for TPA or thrombectomy.  He was subsequently   transferred to [facility] for higher level of care.    PAST MEDICAL HISTORY:  Significant for previous history of strokes with   unknown residual symptom, history of hypertension, history of COPD, and   history of prostate cancer.    MEDICATIONS:  Reviewed as per MAR.    ALLERGIES:  No known drug allergies.    SOCIAL HISTORY:  He is a current smoker, smokes 1-1/2 pack per day for many   years.  He drinks 3-6 beers daily.  No history of drug abuse.    FAMILY HISTORY:  Unable to obtain.  According to his medical record, his   father had myocardial infarction.    REVIEW OF SYSTEMS:  Unable to obtain.    PHYSICAL EXAMINATION:  VITAL SIGNS:  Today, heart rate 100, blood pressure 133/51, respirations 18,   O2 sat 93% on 3 liters nasal cannula, temperature 36.8.  NECK:  Supple.  No carotid bruit.  CARDIOVASCULAR:  Regular rate and  rhythm.  LUNGS:  Clear.  ABDOMEN:  Soft.  EXTREMITIES:  No cyanosis or clubbing.  NEUROLOGIC:  He is nonverbal and unfortunately, he does not follow command   likely due to his receptive aphasia.  His eyes are open.  There is slight   right facial weakness.  He blinks to visual threat in left eye, but there is   no reaction in right eye.  His pupils are equal and reactive.  Extraocular   movements cannot be tested.  His right upper extremity is flaccid.  He has   some muscle tone in left upper extremity, but again he does not move any of   his extremity.  He pulls his leg back to physical stimulation of both legs.    Sensation and coordination cannot be tested.  Deep tendon reflexes are   diffusely diminished.  Plantar reflexes are upgoing on the right, downgoing on   the left.    ASSESSMENT AND PLAN:  An 83-year-old male with what appeared to be large left   middle cerebral artery infarct.  The patient was not a candidate for   administration of tissue plasminogen activator or thrombectomy.  He is here   for higher level of care and stroke workup.  We will proceed with a stroke   workup including brain MRI, carotid ultrasound, echocardiogram.  We will check   lipid profile.  He will be started on aspirin and statin.  He will be   evaluated by physical therapy, occupational therapy and speech therapy.  He   will be on Lovenox for deep venous thrombosis prevention.  Further   recommendation after reviewing brain MRI.  If brain MRI is not feasible at   this time, recommend to obtain brain CT, and if he has large stroke with   cerebral edema, would recommend starting hypertonic saline for goal of sodium   between 145-155.       ____________________________________     MD MIREYA Blake / XIMENA    DD:  02/23/2018 17:59:46  DT:  02/23/2018 18:19:13    D#:  [id number]  Job#:  [id number]

## 2018-02-24 NOTE — DIETARY
"Nutrition services:  Nutrition Support Assessment     Day 1 of admit.  Nick Billings is a 83 y.o. male with admitting DX of Stroke.     Current problem list:  1. Stroke  2. Essential hypertension, benign  3. COPD     Assessment:  Estimated Nutritional Needs based on:   Height: 182 cm (5' 11.65\")  Weight: 85.3 kg (188 lb 0.8 oz)  Weight to Use in Calculations: 85.3 kg (188 lb 0.8 oz)  Ideal Body Weight: 78 kg (172 lb)  Percent Ideal Body Weight: 109.3  Body mass index is 25.75 kg/m².     Calculation/Equation: MSJ x 1.2 = 1898 kcals/day  Total Calories / day: 1898 -  2098 (Calories / k - 25)  Total Grams Protein / day: 103 - 123  (Grams Protein / k.2 - 1.4)     Evaluation:   1. Pt with hx of strokes; non-verbal with severe expressive aphasia.  2. Swallow evaluation by SLP ; recommended NPO with alternate source of nutrition.  3. Cortrak placed and verified.  4. Tube feeding consult received.  5. Labs and meds reviewed.     Recommendations/Plan:  1. Start Replete with Fiber @ 25 mL/hr and advance per protocol to goal rate of 80 mL/hr.  Tube feeding at goal provides 1920 kcals, 123 grams of protein and 1594 mL of free water per day.  2. Fluids per MD.  3. RD to monitor wt and lab trends.    RD following.              "

## 2018-02-24 NOTE — PROGRESS NOTES
Assumed care of patient.  Pt is non-verbal with severe expressive aphasia.  Pt does not follow commands.  Pt is NPO.  Tele in place.  Waffle overlay in place.  SCDs in place. Q2Hr turns in place.  Barnett in place.  Call light within reach. Hourly rounding in place.

## 2018-02-24 NOTE — CARE PLAN
Problem: Venous Thromboembolism (VTW)/Deep Vein Thrombosis (DVT) Prevention:  Goal: Patient will participate in Venous Thrombosis (VTE)/Deep Vein Thrombosis (DVT)Prevention Measures  Outcome: PROGRESSING AS EXPECTED  SCDs in place.

## 2018-02-24 NOTE — CONSULTS
Reason for PC Consult: Advance Care Planning    Consulted by:   Dr. Shook    Assessment:  General:   83 year old male admitted for stroke on 2/23/18. Pt has a history of COPD and prostate cancer. Pt's daughter noticed pt unable to speak, sent him to [facility], CT revealed subacute stroke in the left parietal and temporal lobes. Pt then transferred to [facility] for higher level of care.     Dyspnea: No, 95% on 3L NC  Last BM: 02/23/18    Pain: Unable to determine    Depression: Unable to determine    Dementia: Unable to Determine      Spiritual:  Is Temple or spirituality important for coping with this illness? Unable to determine   Has a  or spiritual provider visit been requested? Unable to determine    Palliative Performance Scale: 10%    Advance Directive: None on File   DPOA: None on File  POLST: None on File    Code Status: DNR      Outcome:  PC RN visited pt at bedside, pt has severe aphasia and unable to express self.     Discussed with Dr. Shook, no family has been present.    PC RN Called [place] ([phone]), phone went to a fax machine.  Called ([phone]) phone no longer in service.    Called [facility], spoke with Charge RN. Emergency contact is listed as a Kristen ([phone]) and pt does have an AD on file. PC RN Faxed her an AD request to sent a copy of pt's AD.     Updated:   Dr. Shook    Plan:   Get into contact with pt's dtr to assist in GOC discussion    Recommendations: I do not recommend an ethics or hospice consult at this time because GOC have not been established.    Thank you for allowing Palliative Care to participate in this patient's care. Please feel free to call [phone] with any questions or concerns.

## 2018-02-24 NOTE — PROGRESS NOTES
IMPRESSION    1. Worsened cognition? Nonverbal and hard to get Hx from the patient  2. Underlined dementia? Binswanger's disease?  3. Hx of old strokes, superimposed seizure?    MANAGEMENT      arouseable but not able to express himself   Not able to follow commands  Waiting for MRI of brain  May need  to help the care--  Could the patient just need nursing placement? Was the patient really normal in his baseline?      Vitals:    02/24/18 0000 02/24/18 0400 02/24/18 0700 02/24/18 1100   BP: 143/60 103/81 125/66 138/74   Pulse: 95 85 99 91   Resp: 18 16 16 16   Temp: 37 °C (98.6 °F) 37.5 °C (99.5 °F) 36.7 °C (98.1 °F) 36.8 °C (98.2 °F)   SpO2: 96% 97% 95% 95%   Weight:       Height:         Physical Exam   Constitutional: He is well-developed, well-nourished, and in no distress.   Eyes: Pupils are equal, round, and reactive to light.   Pulmonary/Chest: Effort normal.   Musculoskeletal: Normal range of motion.   Neurological: He is alert.   Non-verbal   Skin: Skin is warm.         Review of Systems   HENT: Negative for nosebleeds.    Eyes: Positive for redness.   Gastrointestinal: Negative for blood in stool.   Genitourinary: Negative for hematuria.   Musculoskeletal: Negative for back pain.   Neurological: Positive for speech change and weakness.       ________________________________________________________________________    NOTE from Dr Brown     The patient is an 83-year-old male with past   medical history significant for previous stroke with unknown residual symptoms   and history of chronic obstructive pulmonary disease and prostate cancer who   was transferred from an outside facility for evaluation of aphasia and   right-sided weakness.  Unfortunately, there is no family around and the   patient is nonverbal and I was not able to get a good history from him.    However, I discussed the case with Dr. Shook who reviewed his medical   record at the time of transfer and it appears he was seen  normal yesterday   morning around 11:00 a.m. and was later on at 3:00 p.m. noted to have   difficulty talking with right-sided weakness.  He was initially taken to [facility] where he underwent a brain CT, which revealed evidence of   subacute infarct in the distribution of the left MCA.  The patient was found   not to be a candidate for TPA or thrombectomy.  He was subsequently   transferred to [facility] for higher level of care.  ________________________________________________________________________    Right carotid:   Intimal wall thickening visualized throughout the common carotid artery.   Plaque of the bifurcation extending into the internal carotid. Velocities    are consistent with < 50% stenosis of the internal carotid artery.    Plaque is irregular on the surface and heterogeneous with mixed acoustic    densities.    Subclavian artery velocities and waveforms are normal.     Left carotid   Intimal wall thickening visualized throughout the common carotid artery.   Plaque of the bifurcation extending into the internal carotid. Velocities    are consistent with < 50% stenosis of the internal carotid artery.    Plaque is irregular on the surface and heterogeneous with mixed acoustic    densities.    Elevated velocities are demonstrated in the left subclavian artery.    CT 2016-- extensive white matter disease    \

## 2018-02-24 NOTE — RESPIRATORY CARE
COPD EDUCATION by COPD CLINICAL EDUCATOR  2/24/2018 at 7:01 AM by Chela Vinson     Patient reviewed by COPD education team. Patient does not qualify for COPD program.

## 2018-02-24 NOTE — PALLIATIVE CARE
Palliative Care follow-up  Received pt's AD, scanned into EMR.    Called Kristen (DPOA) ([phone]) she states she will be at bedside tomorrow and is willing to discuss pt's clinical picture with MD.       Updated:   Dr. Shook    Plan:   Pt's dtr Kristen will be at bedside tomorrow.     Thank you for allowing Palliative Care to participate in this patient's care. Please feel free to call [phone] with any questions or concerns.

## 2018-02-24 NOTE — PROGRESS NOTES
Monitor summary: SR 85-99, MS 0.20, QRS 0.08, QT 0.34, with rare PVCs per strip from monitor room.

## 2018-02-24 NOTE — PROGRESS NOTES
Patient seen and examined at bedside, unfortunately patient is aphasic, unable to speak or answer questions, will not follow commands but is alert and will track you with his eyes.   Echocardiogram  Carotid duplex  MRI brain pending  ASA/statin for neuro protective measures.  Neurology consulted, awaiting for recommendations.

## 2018-02-24 NOTE — PROGRESS NOTES
[facility] Hospitalist Progress Note    Date of Service: 2018    Chief Complaint  83 y.o. 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    Interval Problem Update  Patient non verbal, able to track and wakes up but unable to follow commands.   Neurology following  MRI brain pending.  Advance directive reviewed with palliative care, appears he does not want resuscitation and no tube feeds. Will have family conference tomorrow.     Consultants/Specialty  Neurology    Disposition  TBD        Review of Systems   Unable to perform ROS: Patient nonverbal      Physical Exam  Laboratory/Imaging   Hemodynamics  Temp (24hrs), Av °C (98.6 °F), Min:36.7 °C (98.1 °F), Max:37.5 °C (99.5 °F)   Temperature: 36.8 °C (98.2 °F)  Pulse  Av.6  Min: 85  Max: 114   Blood Pressure : 138/74      Respiratory      Respiration: 16, Pulse Oximetry: 95 %        RUL Breath Sounds: Diminished, RML Breath Sounds: Diminished, RLL Breath Sounds: Diminished, MIRYAM Breath Sounds: Diminished, LLL Breath Sounds: Diminished    Fluids    Intake/Output Summary (Last 24 hours) at 18 1521  Last data filed at 18 0900   Gross per 24 hour   Intake              900 ml   Output              600 ml   Net              300 ml       Nutrition  Orders Placed This Encounter   Procedures   • Diet NPO     Standing Status:   Standing     Number of Occurrences:   1     Order Specific Question:   Restrict to:     Answer:   Strict [1]     Physical Exam   Constitutional: No distress.   HENT:   Head: Normocephalic and atraumatic.   Eyes: Conjunctivae are normal. Pupils are equal, round, and reactive to light. Right eye exhibits no discharge. Left eye exhibits no discharge. No scleral icterus.   Neck: No thyromegaly present.   Cardiovascular: Intact distal pulses.    No murmur heard.  Pulmonary/Chest: No stridor. No respiratory distress. He has no wheezes. He has no rales.   Abdominal: Soft. Bowel sounds are normal. He exhibits  no distension. There is no tenderness. There is no rebound.   Musculoskeletal: He exhibits no edema.   Neurological: He is alert.   Does not follow commands, tracks movement at times.    Skin: Skin is warm. He is not diaphoretic.       Recent Labs      02/23/18 0220 02/24/18   0343   WBC  10.8  9.3   RBC  4.27*  3.82*   HEMOGLOBIN  14.2  12.5*   HEMATOCRIT  41.5*  37.4*   MCV  97.2  97.9*   MCH  33.3*  32.7   MCHC  34.2  33.4*   RDW  49.6  48.5   PLATELETCT  229  187   MPV  9.3  8.9*     Recent Labs      02/23/18 0220 02/24/18   0343   SODIUM  137  135   POTASSIUM  4.3  4.0   CHLORIDE  106  105   CO2  22  23   GLUCOSE  120*  106*   BUN  20  18   CREATININE  1.14  0.78   CALCIUM  9.3  9.1             Recent Labs      02/24/18   0343   TRIGLYCERIDE  83   HDL  43   LDL  66          Assessment/Plan     * Stroke (CMS-HCC)   Assessment & Plan    CT with left sided parietal and temporal lobe stroke  Patient with very poor prognosis for recovery based on neurology, previous physician was told by daugther she wants to pursue further teatment, however patient's advance directive says no tube feeds, and DNR.   MRI brain pending.  Echocardiogram: LVEF 75% no abnormalities.   Aspirin, statin cont for neuroprotective measures        COPD (chronic obstructive pulmonary disease) (CMS-HCC)- (present on admission)   Assessment & Plan    not in acute exacerbation currently  Continue RT protocol, duo nebs, Pep therapy if warranted, and incentive spirometry.           Essential hypertension, benign- (present on admission)   Assessment & Plan    Hx of allow for permissive, been 24 hours.  Resume coreg and losartan.  Prn hydralazine as needed if sbp>180          Quality-Core Measures   Reviewed items::  Labs reviewed, Radiology images reviewed and Medications reviewed  Barnett catheter::  No Barnett  DVT prophylaxis pharmacological::  Heparin  DVT prophylaxis - mechanical:  SCDs

## 2018-02-24 NOTE — CARE PLAN
Problem: Skin Integrity  Goal: Risk for impaired skin integrity will decrease  Outcome: PROGRESSING AS EXPECTED  Q2hr turns in place.

## 2018-02-25 NOTE — PROGRESS NOTES
IMPRESSION    1. Worsened cognition? Nonverbal and hard to get Hx from the patient  2. Underlined dementia? Binswanger's disease?  3. Hx of old strokes, superimposed seizure?    MANAGEMENT      arouseable but not able to express himself -- no change since 2/24/2018  Not able to follow commands    ________________________________________________________________________      Waiting for MRI of brain plus I have not talked to the family regarding family's expectations    Underlined vascular? dementia + new stroke?    Tomorrow , Dr Vasquez will continue to follow this patient    May need  to help the care--    Could the patient just need nursing placement? Was the patient really normal in his baseline?  ________________________________________________________________________        Vitals:    02/24/18 2100 02/25/18 0000 02/25/18 0400 02/25/18 0800   BP: 160/58 144/57 147/54 137/60   Pulse: 95 94 92 86   Resp: 18 20 20 15   Temp: 36.5 °C (97.7 °F) 37.2 °C (99 °F) 36.9 °C (98.4 °F) 37.3 °C (99.2 °F)   SpO2: 94% 97% 96% 98%   Weight: 88.4 kg (194 lb 14.2 oz)      Height:         Physical Exam   Constitutional: He is well-developed, well-nourished, and in no distress.   Eyes: Pupils are equal, round, and reactive to light.   Pulmonary/Chest: Effort normal.   Musculoskeletal: Normal range of motion.   Neurological: He is alert.   Non-verbal   Skin: Skin is warm.         Review of Systems   HENT: Negative for nosebleeds.    Eyes: Positive for redness.   Gastrointestinal: Negative for blood in stool.   Genitourinary: Negative for hematuria.   Musculoskeletal: Negative for back pain.   Neurological: Positive for speech change and weakness.       ________________________________________________________________________    NOTE from Dr Brown     The patient is an 83-year-old male with past   medical history significant for previous stroke with unknown residual symptoms   and history of chronic obstructive pulmonary  disease and prostate cancer who   was transferred from an outside facility for evaluation of aphasia and   right-sided weakness.  Unfortunately, there is no family around and the   patient is nonverbal and I was not able to get a good history from him.    However, I discussed the case with Dr. Shook who reviewed his medical   record at the time of transfer and it appears he was seen normal yesterday   morning around 11:00 a.m. and was later on at 3:00 p.m. noted to have   difficulty talking with right-sided weakness.  He was initially taken to [facility] where he underwent a brain CT, which revealed evidence of   subacute infarct in the distribution of the left MCA.  The patient was found   not to be a candidate for TPA or thrombectomy.  He was subsequently   transferred to [facility] for higher level of care.  ________________________________________________________________________    Right carotid:   Intimal wall thickening visualized throughout the common carotid artery.   Plaque of the bifurcation extending into the internal carotid. Velocities    are consistent with < 50% stenosis of the internal carotid artery.    Plaque is irregular on the surface and heterogeneous with mixed acoustic    densities.    Subclavian artery velocities and waveforms are normal.     Left carotid   Intimal wall thickening visualized throughout the common carotid artery.   Plaque of the bifurcation extending into the internal carotid. Velocities    are consistent with < 50% stenosis of the internal carotid artery.    Plaque is irregular on the surface and heterogeneous with mixed acoustic    densities.    Elevated velocities are demonstrated in the left subclavian artery.    CT 2016-- extensive white matter disease    \

## 2018-02-25 NOTE — CARE PLAN
Problem: Skin Integrity  Goal: Risk for impaired skin integrity will decrease  Outcome: PROGRESSING AS EXPECTED  Patient turned Q 2 hours, waffle cushion in place, barrier cream used.

## 2018-02-25 NOTE — CARE PLAN
Problem: Respiratory:  Goal: Respiratory status will improve  Outcome: PROGRESSING SLOWER THAN EXPECTED  Patient has thick secretion unable tot clear on own, MD notified RT on board, suction at bedside.

## 2018-02-25 NOTE — PROGRESS NOTES
[facility] Hospitalist Progress Note    Date of Service: 2018    Chief Complaint  83 y.o. 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    Interval Problem Update  Patient non verbal, able to track and wakes up but unable to follow commands.   Neurology following  MRI brain pending.  Advance directive reviewed with palliative care, appears he does not want resuscitation and no tube feeds. Will have family conference tomorrow.       No acute clinical events overnight, bernardo spencer unfortunately still non verbal, opens his eyes when stimulated but otherwise non verbal.  Daughter to come for conference, dont know if she will be able to come.  Advance directive reviewed with palliative. Patient wishes DNR, no feeding tubes.  Given degree of his mental change, recommend comfort care hospice, will discuss with daughter at this point.    Consultants/Specialty  Neurology  Palliative care    Disposition  TBD        Review of Systems   Unable to perform ROS: Patient nonverbal      Physical Exam  Laboratory/Imaging   Hemodynamics  Temp (24hrs), Av.9 °C (98.5 °F), Min:36.5 °C (97.7 °F), Max:37.3 °C (99.2 °F)   Temperature: 37.3 °C (99.2 °F)  Pulse  Av  Min: 85  Max: 114   Blood Pressure : 137/60      Respiratory      Respiration: 15, Pulse Oximetry: 98 %        RUL Breath Sounds: Coarse Crackles, RML Breath Sounds: Coarse Crackles, RLL Breath Sounds: Coarse Crackles, MIRYAM Breath Sounds: Coarse Crackles, LLL Breath Sounds: Coarse Crackles    Fluids    Intake/Output Summary (Last 24 hours) at 18 1250  Last data filed at 18 1800   Gross per 24 hour   Intake                0 ml   Output              700 ml   Net             -700 ml       Nutrition  Orders Placed This Encounter   Procedures   • Diet NPO     Standing Status:   Standing     Number of Occurrences:   1     Order Specific Question:   Restrict to:     Answer:   Strict [1]   exam unchanged  Physical Exam    Constitutional: No distress.   HENT:   Head: Normocephalic and atraumatic.   Eyes: Conjunctivae are normal. Pupils are equal, round, and reactive to light. Right eye exhibits no discharge. Left eye exhibits no discharge. No scleral icterus.   Neck: No thyromegaly present.   Cardiovascular: Intact distal pulses.    No murmur heard.  Pulmonary/Chest: No stridor. No respiratory distress. He has no wheezes. He has no rales.   Abdominal: Soft. Bowel sounds are normal. He exhibits no distension. There is no tenderness. There is no rebound.   Musculoskeletal: He exhibits no edema.   Neurological: He is alert.   Does not follow commands, tracks movement at times.    Skin: Skin is warm. He is not diaphoretic.       Recent Labs      02/23/18 0220 02/24/18 0343 02/25/18   0304   WBC  10.8  9.3  8.9   RBC  4.27*  3.82*  3.77*   HEMOGLOBIN  14.2  12.5*  12.3*   HEMATOCRIT  41.5*  37.4*  36.8*   MCV  97.2  97.9*  97.6   MCH  33.3*  32.7  32.6   MCHC  34.2  33.4*  33.4*   RDW  49.6  48.5  46.3   PLATELETCT  229  187  195   MPV  9.3  8.9*  8.9*     Recent Labs      02/23/18 0220 02/24/18 0343  02/25/18   0304   SODIUM  137  135  133*   POTASSIUM  4.3  4.0  3.8   CHLORIDE  106  105  102   CO2  22  23  24   GLUCOSE  120*  106*  142*   BUN  20  18  15   CREATININE  1.14  0.78  0.71   CALCIUM  9.3  9.1  8.7             Recent Labs      02/24/18   0343   TRIGLYCERIDE  83   HDL  43   LDL  66          Assessment/Plan     * Stroke (CMS-HCC)   Assessment & Plan    CT with left sided parietal and temporal lobe stroke  Patient with very poor prognosis for recovery based on neurology, previous physician was told by daugther she wants to pursue further teatment, however patient's advance directive says no tube feeds, and DNR.   MRI brain pending.  Echocardiogram: LVEF 75% no abnormalities.   Aspirin, statin cont for neuroprotective measures  Clinical exam unchanged.        COPD (chronic obstructive pulmonary disease) (CMS-HCC)-  (present on admission)   Assessment & Plan    not in acute exacerbation currently  Continue RT protocol, duo nebs, Pep therapy if warranted, and incentive spirometry.           Essential hypertension, benign- (present on admission)   Assessment & Plan    Continue coreg and losartan.  Prn hydralazine as needed if sbp>180        Patient plan of care discussed at multidisplinary team rounds and with patient and R.N at beside.    Quality-Core Measures   Reviewed items::  Labs reviewed, Radiology images reviewed and Medications reviewed  Barnett catheter::  No Barnett  DVT prophylaxis pharmacological::  Heparin  DVT prophylaxis - mechanical:  SCDs

## 2018-02-26 NOTE — EEG PROGRESS NOTE
EEG 02/26/18 10:40 AM    ROUTINE ELECTROENCEPHALOGRAM REPORT      NAME: Nick Billings    REFERRING Dr:    INDICATION: change of mental status      TECHNIQUE: 30 channel routine electroencephalogram (EEG) was performed in accordance with the international 10-20 system. The study was reviewed in bipolar and referential montages. The recording examined the patient during wakeful and drowsy state(s).     DESCRIPTION OF THE RECORD:      Background rhythm during awake stage shows well-organized, well-developed, average voltage 8 to 9 hertz alpha activity in the posterior regions. The background is more robust over right.  No spike-and-wave discharges but there are nonspecific non-localizing short low amplitude delta/theta    Photic stimulation did not produce any abnormalities.  Stage I sleep was achieved.      ACTIVATION PROCEDURES:      Photic Stimulation were done    ICTAL AND/OR INTERICTAL FINDINGS:      No spike-and-wave discharges but there are nonspecific non-localizing short low amplitude delta/theta   No clinical events or seizures were reported or recorded during the study.      EKG: sampling of the EKG recording demonstrated sinus rhythm.        INTERPRETATION:      ________________________________________________________________________    This scalp EEG is consistent with diffuse nonspecific cortical dysfunction - mild to moderate ( more over left)     This nonspecific abnormalities could be secondary to CNS degeneration, metabolic, toxic, polypharmacy or even post-ictal    There are no epileptiform discharges in this record    Of note, unremarkable EEG does not completely exclude the diagnosis  of seizures since seizure is an episodic phenomena.  Clinical correlation may help     If clinical suspicion of seizure remains high.  Prolonged outpatient EEG   monitoring may be of help.    EEG 02/26/18 10:45 AM    ________________________________________________________________________

## 2018-02-26 NOTE — PROCEDURES
DATE OF SERVICE:  02/26/2018    This is an inpatient EEG.  The EEG was done on 02/26/2018.    ROUTINE ELECTROENCEPHALOGRAM REPORT        NAME: Manuelito, Damon     REFERRING Dr:     INDICATION: change of mental status        TECHNIQUE: 30 channel routine electroencephalogram (EEG) was performed in accordance with the international 10-20 system. The study was reviewed in bipolar and referential montages. The recording examined the patient during wakeful and drowsy state(s).      DESCRIPTION OF THE RECORD:        Background rhythm during awake stage shows well-organized, well-developed, average voltage 8 to 9 hertz alpha activity in the posterior regions. The background is more robust over right.  No spike-and-wave discharges but there are nonspecific non-localizing short low amplitude delta/theta    Photic stimulation did not produce any abnormalities.  Stage I sleep was achieved.        ACTIVATION PROCEDURES:       Photic Stimulation were done     ICTAL AND/OR INTERICTAL FINDINGS:      No spike-and-wave discharges but there are nonspecific non-localizing short low amplitude delta/theta   No clinical events or seizures were reported or recorded during the study.       EKG: sampling of the EKG recording demonstrated sinus rhythm.          INTERPRETATION:        ________________________________________________________________________     This scalp EEG is consistent with diffuse nonspecific cortical dysfunction - mild to moderate ( more over left)      This nonspecific abnormalities could be secondary to CNS degeneration, metabolic, toxic, polypharmacy or even post-ictal     There are no epileptiform discharges in this record     Of note, unremarkable EEG does not completely exclude the diagnosis  of seizures since seizure is an episodic phenomena.  Clinical correlation may help     If clinical suspicion of seizure remains high.  Prolonged outpatient EEG   monitoring may be of help.     EEG 02/26/18 10:45  AM     ________________________________________________________________________                           ____________________________________     MD FARIBA LEONARDO    DD:  02/26/2018 10:46:55  DT:  02/26/2018 11:10:31    D#:  [id number]  Job#:  [id number]

## 2018-02-26 NOTE — CARE PLAN
Problem: Respiratory:  Goal: Respiratory status will improve  Outcome: PROGRESSING SLOWER THAN EXPECTED  Patient having wet cough. Suction at bedside. Frequent suctioning provided.     Problem: Urinary:  Goal: Ability to maintain continence will improve  Outcome: PROGRESSING SLOWER THAN EXPECTED  Barnett catheter removed during day shift. Awaiting pt. To void before 2300. Will monitor I's & O's and straight cath if needed.

## 2018-02-26 NOTE — PROGRESS NOTES
Patient seen resting comfortably in bed.  Unable to assess orientation. Expressive aphasia present.  Patient does open eye when name is called.  No signs of pain noted upon assessment.  Wet cough noted. Suctioning provided, however, not much suctioned.   Oral care complete.   Q2 hour turns in place.  Tele monitor on.  Tube feedings infusing at goal rate of 80 cc/hr.  All needs addressed.  Call light within reach, bed locked and in lowest position, bed alarm on and hourly rounding in place.

## 2018-02-26 NOTE — PROGRESS NOTES
Neurology Progress Note        Subjective:  I am following up Mr. Billings in neurological consultation today.  He is an 83 year old man who was admitted with expressive aphasia.  He remains the same, he is not attempting to speak or follow commands.    Objective:  Vitals:  Vitals:    02/25/18 2000 02/26/18 0400 02/26/18 0710 02/26/18 1050   BP: 128/41 138/42 135/45 132/44   Pulse: 84 83 89 83   Resp: 17 17 16 16   Temp: 36.9 °C (98.4 °F) 37.1 °C (98.7 °F) 37.1 °C (98.7 °F) 37.2 °C (98.9 °F)   SpO2: 94% 92% 93% 95%   Weight:       Height:         General:  Awake, alert and in no apparent distress  Mental Status:  Alert, looking around the room, not speaking not following commands.  Cranial Nerves:  PERRL, EOMI with no nystagmus, face is symmetric, facial sensation is intact.    Motor: Withdraws all 4 limbs to noxious stimuli.  Coordination:  Did not participate.  Gait:  Deferred    Recent Labs      02/24/18   0343  02/25/18   0304   WBC  9.3  8.9   RBC  3.82*  3.77*   HEMOGLOBIN  12.5*  12.3*   HEMATOCRIT  37.4*  36.8*   MCV  97.9*  97.6   MCH  32.7  32.6   RDW  48.5  46.3   PLATELETCT  187  195   MPV  8.9*  8.9*   NEUTSPOLYS  73.90*  74.40*   LYMPHOCYTES  15.90*  14.40*   MONOCYTES  9.60  10.70   EOSINOPHILS  0.10  0.10   BASOPHILS  0.20  0.20     Recent Labs      02/24/18   0343  02/25/18   0304   SODIUM  135  133*   POTASSIUM  4.0  3.8   CHLORIDE  105  102   CO2  23  24   GLUCOSE  106*  142*   BUN  18  15     MRI brain (2/25/18):  1.  Acute infarcts in the right frontal and parietal lobes.  2.  Subacute infarcts in the left temporal and parietal lobes.  3.  Cortical based petechial hemorrhages in the bilateral frontal, left temporal and parietal lobes likely secondary to the coexistent amyloid angiopathy.. Chronic infarcts in the bilateral basal ganglia.  4.  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  5.  Severe chronic atherosclerotic disease in the M1 segment of the left middle cerebral artery.  6.  Moderate to severe cerebral atrophy.    Impression:  Mr. Billings is an 83 year old man with multifocal acute infarcts which have left him with a dense aphasia.  He had pre-existing infarcts and severe microvascular white matter disease.  Given his overall stroke burden on his MRI I think his prognosis for speech and comprehension recovery is generally poor.    1.  Notes suggest a meeting with family to determine goals of care.  Would continue aspirin and statin in the meantime for secondary stroke risk reduction.  Strokes appear embolic in nature, telemetry to rule out paroxysmal afib should be done.  2.  Please let me know if my input is needed in a family meeting regarding prognosis.  3.  Will sign off for now, please call if further needs arise.    Recommendations:

## 2018-02-26 NOTE — PROGRESS NOTES
Barnett catheter removed during day shift at about 1730.  2300: Pt. Without voiding. Bladder scan result 266  0230: Still no void. Bladder scan result 351  0345: Bladder scan result 451  0400: Pt. Straight cath'd. 375 mL output.

## 2018-02-27 NOTE — PALLIATIVE CARE
Palliative Care follow-up  PC RN reviewed notes, dtr did not come in for family conference. Called dtr Kristen ([phone]), no answer, voicemail not set up.    Kristen returned call, she will be available after 1230 today via phone since she lifes in [place] and unable to come to bedside.     Left msg for  to return call.        Plan:  Have phone conference today with dtr after 1230 to discuss GOC.        Thank you for allowing Palliative Care to participate in this patient's care. Please feel free to call [phone] with any questions or concerns.

## 2018-02-27 NOTE — CARE PLAN
Problem: Nutritional:  Goal: Nutrition support tolerated and meeting greater than 85% of estimated needs  Outcome: MET Date Met: 02/27/18  TF (Replete with Fiber) is at goal rate of 80mL/hr and tolerated, per chart review.   RD following

## 2018-02-27 NOTE — DISCHARGE PLANNING
Received choice form from Judi([employer]). [facility] and [facility].

## 2018-02-27 NOTE — PROGRESS NOTES
[facility]ist Progress Note    Date of Service: 2018    Chief Complaint  83 y.o. 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    Interval Problem Update  Patient non verbal, able to track and wakes up but unable to follow commands.   Neurology following  MRI brain pending.  Advance directive reviewed with palliative care, appears he does not want resuscitation and no tube feeds. Will have family conference tomorrow.       No acute clinical events overnight, bernardo spencer unfortunately still non verbal, opens his eyes when stimulated but otherwise non verbal.  Daughter to come for conference, dont know if she will be able to come.  Advance directive reviewed with palliative. Patient wishes DNR, no feeding tubes.  Given degree of his mental change, recommend comfort care hospice, will discuss with daughter at this point.      No changes clinically. Mental status unchanged.  Will d/c tube feeds, given advance directive.  daughter still not present for family meeting.    Consultants/Specialty  Neurology  Palliative care    Disposition  TBD        Review of Systems   Unable to perform ROS: Patient nonverbal      Physical Exam  Laboratory/Imaging   Hemodynamics  Temp (24hrs), Av.1 °C (98.7 °F), Min:36.9 °C (98.4 °F), Max:37.2 °C (98.9 °F)   Temperature: 37 °C (98.6 °F)  Pulse  Av.7  Min: 64  Max: 114   Blood Pressure : 147/51      Respiratory      Respiration: 16, Pulse Oximetry: 97 %        RUL Breath Sounds: Diminished, RML Breath Sounds: Diminished, RLL Breath Sounds: Coarse Crackles, MIRYAM Breath Sounds: Diminished, LLL Breath Sounds: Coarse Crackles    Fluids    Intake/Output Summary (Last 24 hours) at 18 1731  Last data filed at 18 1200   Gross per 24 hour   Intake             1320 ml   Output              975 ml   Net              345 ml       Nutrition  Orders Placed This Encounter   Procedures   • Diet NPO     Standing Status:   Standing     Number  of Occurrences:   1     Order Specific Question:   Restrict to:     Answer:   Strict [1]   exam unchanged from yesterday  Physical Exam   Constitutional: No distress.   HENT:   Head: Normocephalic and atraumatic.   Eyes: Conjunctivae are normal. Pupils are equal, round, and reactive to light. Right eye exhibits no discharge. Left eye exhibits no discharge. No scleral icterus.   Neck: No thyromegaly present.   Cardiovascular: Intact distal pulses.    No murmur heard.  Pulmonary/Chest: No stridor. No respiratory distress. He has no wheezes. He has no rales.   Abdominal: Soft. Bowel sounds are normal. He exhibits no distension. There is no tenderness. There is no rebound.   Musculoskeletal: He exhibits no edema.   Neurological: He is alert.   Does not follow commands, tracks movement at times.    Skin: Skin is warm. He is not diaphoretic.       Recent Labs      02/24/18   0343  02/25/18   0304   WBC  9.3  8.9   RBC  3.82*  3.77*   HEMOGLOBIN  12.5*  12.3*   HEMATOCRIT  37.4*  36.8*   MCV  97.9*  97.6   MCH  32.7  32.6   MCHC  33.4*  33.4*   RDW  48.5  46.3   PLATELETCT  187  195   MPV  8.9*  8.9*     Recent Labs      02/24/18   0343  02/25/18   0304   SODIUM  135  133*   POTASSIUM  4.0  3.8   CHLORIDE  105  102   CO2  23  24   GLUCOSE  106*  142*   BUN  18  15   CREATININE  0.78  0.71   CALCIUM  9.1  8.7             Recent Labs      02/24/18   0343   TRIGLYCERIDE  83   HDL  43   LDL  66          Assessment/Plan     * Stroke (CMS-HCC)   Assessment & Plan    CT with left sided parietal and temporal lobe stroke  Patient with very poor prognosis for recovery based on neurology, previous physician was told by daugther she wants to pursue further teatment, however patient's advance directive says no tube feeds, and DNR.   MRI brain pending.  Echocardiogram: LVEF 75% no abnormalities.   Aspirin, statin cont for neuroprotective measures  Clinical exam unchanged.  Neurology signed off         COPD (chronic obstructive pulmonary  disease) (CMS-HCC)- (present on admission)   Assessment & Plan    not in acute exacerbation currently  Continue RT protocol, duo nebs, Pep therapy if warranted, and incentive spirometry.           Essential hypertension, benign- (present on admission)   Assessment & Plan    Continue coreg and losartan.  Prn hydralazine as needed if sbp>180        Patient plan of care discussed at multidisplinary team rounds and with patient and R.N at beside.    Quality-Core Measures   Reviewed items::  Labs reviewed, Radiology images reviewed and Medications reviewed  Barnett catheter::  No Barnett  DVT prophylaxis pharmacological::  Heparin  DVT prophylaxis - mechanical:  SCDs

## 2018-02-27 NOTE — DISCHARGE PLANNING
SW called pt's daughter for SNF choice. 1.) [facility] 2.) [place].     Faxed to [facility].

## 2018-02-28 PROBLEM — Z51.5 COMFORT MEASURES ONLY STATUS: Status: ACTIVE | Noted: 2018-01-01

## 2018-02-28 NOTE — PROGRESS NOTES
[facility]ist Progress Note    Date of Service: 2018    Chief Complaint  83 y.o. 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    Interval Problem Update  Patient non verbal, able to track and wakes up but unable to follow commands.   Neurology following  MRI brain pending.  Advance directive reviewed with palliative care, appears he does not want resuscitation and no tube feeds. Will have family conference tomorrow.       No acute clinical events overnight, bernardo spencer unfortunately still non verbal, opens his eyes when stimulated but otherwise non verbal.  Daughter to come for conference, dont know if she will be able to come.  Advance directive reviewed with palliative. Patient wishes DNR, no feeding tubes.  Given degree of his mental change, recommend comfort care hospice, will discuss with daughter at this point.      No changes clinically. Mental status unchanged.  Will d/c tube feeds, given advance directive.  daughter still not present for family meeting.      Patient is nonverbal. Following with his gaze. Almost no active movements  Neurology signed off yesterday due to very poor prognosis for cognitive recovery. Discussed with the daughter by phone, who understands the patient has poor prognosis, updated on MRI results and on neurology consult. She is not receptive to idea of starting comfort care and stoping tube feeds.Asking if hospice service is available in [place] and wants the patient to be transferred overthere.  Will ask for palliative care input.  Probably, she needs more discussion    Consultants/Specialty  Neurology  Palliative care    Disposition  TBD        Review of Systems   Unable to perform ROS: Patient nonverbal      Physical Exam  Laboratory/Imaging   Hemodynamics  Temp (24hrs), Av.4 °C (99.4 °F), Min:36.6 °C (97.8 °F), Max:38.7 °C (101.6 °F)   Temperature: 36.6 °C (97.8 °F)  Pulse  Av.3  Min: 64  Max: 114   Blood Pressure :  144/65      Respiratory      Respiration: 16, Pulse Oximetry: 98 %        RUL Breath Sounds: Diminished, RML Breath Sounds: Diminished, RLL Breath Sounds: Coarse Crackles, MIRYAM Breath Sounds: Diminished, LLL Breath Sounds: Coarse Crackles    Fluids    Intake/Output Summary (Last 24 hours) at 02/27/18 1938  Last data filed at 02/27/18 1200   Gross per 24 hour   Intake             1425 ml   Output              450 ml   Net              975 ml       Nutrition  Orders Placed This Encounter   Procedures   • Diet NPO     Standing Status:   Standing     Number of Occurrences:   1     Order Specific Question:   Restrict to:     Answer:   Strict [1]   exam unchanged from yesterday  Physical Exam   Constitutional: No distress.   HENT:   Head: Normocephalic and atraumatic.   Eyes: Conjunctivae are normal. Pupils are equal, round, and reactive to light. Right eye exhibits no discharge. Left eye exhibits no discharge. No scleral icterus.   Neck: No thyromegaly present.   Cardiovascular: Intact distal pulses.    No murmur heard.  Pulmonary/Chest: No stridor. No respiratory distress. He has no wheezes. He has no rales.   Abdominal: Soft. Bowel sounds are normal. He exhibits no distension. There is no tenderness. There is no rebound.   Musculoskeletal: He exhibits no edema.   Neurological: He is alert.   Does not follow commands, tracks movement at times.    Skin: Skin is warm. He is not diaphoretic.       Recent Labs      02/25/18   0304  02/27/18   1506   WBC  8.9  7.3   RBC  3.77*  3.66*   HEMOGLOBIN  12.3*  12.0*   HEMATOCRIT  36.8*  35.9*   MCV  97.6  98.1*   MCH  32.6  32.8   MCHC  33.4*  33.4*   RDW  46.3  47.7   PLATELETCT  195  225   MPV  8.9*  8.7*     Recent Labs      02/25/18   0304  02/27/18   1506   SODIUM  133*  137   POTASSIUM  3.8  4.0   CHLORIDE  102  103   CO2  24  27   GLUCOSE  142*  100*   BUN  15  15   CREATININE  0.71  0.70   CALCIUM  8.7  9.0                      Assessment/Plan     * Stroke (CMS-[facility])    Assessment & Plan    CT with left sided parietal and temporal lobe stroke  Patient with very poor prognosis for recovery based on neurology, previous physician was told by daugther she wants to pursue further teatment, however patient's advance directive says no tube feeds, and DNR.   MRI showed  acute and subacute strokes in multiple lobes  Echocardiogram: LVEF 75% no abnormalities.   Aspirin, statin cont for neuroprotective measures  Clinical exam unchanged.  Neurology signed off   Updated patient's daughter on MRI results and poor prognosis. Will ask palliative care team to talk to her as well        COPD (chronic obstructive pulmonary disease) (CMS-[facility])- (present on admission)   Assessment & Plan    not in acute exacerbation currently  Continue RT protocol, duo nebs,   Pep therapy if warranted,    incentive spirometry.           Essential hypertension, benign- (present on admission)   Assessment & Plan    Continue coreg and losartan. Monitor blood pressure  Prn hydralazine as needed if sbp>180        Patient plan of care discussed at multidisplinary team rounds and with patient and R.N at beside.    Quality-Core Measures   Reviewed items::  Labs reviewed, Radiology images reviewed and Medications reviewed  Barnett catheter::  No Barnett  DVT prophylaxis pharmacological::  Heparin  DVT prophylaxis - mechanical:  SCDs

## 2018-02-28 NOTE — PALLIATIVE CARE
Palliative Care follow-up  PC RN with Luis Antonio Hopkins, Judi SW, and Dr. Rizo with pt's dtr Kristen met in family room. Dr. Rizo discussed clinical picture, prognosis and recommendations. PC RN discussed tube feedings and quality of life. Judi discussed placement for comfort measures. Luis Antonio discussed pt's advanced directive and answered questions. Kristen agrees to transition pt to comfort focused care, requested that pt be transferred to [facility] or [facility]. PC RN provided contact card and encouraged to call with any questions or concerns,    Active listening and emotional support provided.       Updated:   Bedside RN    Plan:   Comfort care, transfer to long term care closer to home    Recommendations: I recommend a hospice consult as part of the comfort care orders.    Thank you for allowing Palliative Care to participate in this patient's care. Please feel free to call [phone] with any questions or concerns.

## 2018-02-28 NOTE — CARE PLAN
Problem: Communication  Goal: The ability to communicate needs accurately and effectively will improve    Intervention: [place] patient and significant other/support system to call light to alert staff of needs  Lethargic, non verbal       Problem: Skin Integrity  Goal: Risk for impaired skin integrity will decrease    Intervention: Assess risk factors for impaired skin integrity and/or pressure ulcers  Q2 turns in place       Problem: Urinary:  Goal: Ability to maintain continence will improve    Intervention: Assess voiding patterns including incontinent episodes  Incontinent, condom cath placed to protect skin integrity

## 2018-02-28 NOTE — DISCHARGE PLANNING
Attempted to follow up with [facility], however, no answer and no voicemail option available. Will continue to follow up.

## 2018-02-28 NOTE — DIETARY
Nutrition Services: Brief Update    TF are now discontinued as this patient had advanced directive reviewed by palliative care that did not wish for resuscitation or tube feeds. Note pt is DNR status and palliative is following, family meetings with pt's daughter to discuss possibility of Comfort Care.     If plan of care changes, please consult RD as needed.

## 2018-02-28 NOTE — THERAPY
Attempted to see pt for OT tx however pt has been transitioned to comfort care.  Will discuss change in POC with OTR. MW [id number]

## 2018-02-28 NOTE — PROGRESS NOTES
[facility] Hospitalist Progress Note    Date of Service: 2018    Chief Complaint  83 y.o. 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 Vitals stable  Family meeting with patient's daughter Held. Patient's daughter agreed for transitioning to comfort care and discontinuing cortrack, according advanced directives.  Patient made comfort care.     Consultants/Specialty  Neurology  Palliative care    Disposition  TBD        Review of Systems   Unable to perform ROS: Patient nonverbal      Physical Exam  Laboratory/Imaging   Hemodynamics  Temp (24hrs), Av.8 °C (98.3 °F), Min:36.3 °C (97.4 °F), Max:37.4 °C (99.3 °F)   Temperature: 36.4 °C (97.5 °F)  Pulse  Av.1  Min: 64  Max: 114   Blood Pressure : 142/73      Respiratory      Respiration: 16, Pulse Oximetry: 98 %        RUL Breath Sounds: Expiratory Wheezes, RML Breath Sounds: Diminished, RLL Breath Sounds: Diminished, MIRYAM Breath Sounds: Expiratory Wheezes, LLL Breath Sounds: Diminished    Fluids    Intake/Output Summary (Last 24 hours) at 18 1552  Last data filed at 18 0700   Gross per 24 hour   Intake              900 ml   Output                0 ml   Net              900 ml       Nutrition  Orders Placed This Encounter   Procedures   • Diet NPO     Standing Status:   Standing     Number of Occurrences:   1     Order Specific Question:   Restrict to:     Answer:   Strict [1]   exam unchanged from yesterday  Physical Exam   Constitutional: No distress.   HENT:   Head: Normocephalic and atraumatic.   Eyes: Conjunctivae are normal. Pupils are equal, round, and reactive to light. Right eye exhibits no discharge. Left eye exhibits no discharge. No scleral icterus.   Neck: No thyromegaly present.   Cardiovascular: Intact distal pulses.    No murmur heard.  Pulmonary/Chest: No stridor. No respiratory distress. He has no wheezes. He has no  rales.   Abdominal: Soft. Bowel sounds are normal. He exhibits no distension. There is no tenderness. There is no rebound.   Musculoskeletal: He exhibits no edema.   Neurological: He is alert.   Does not follow commands, tracks movement at times.    Skin: Skin is warm. He is not diaphoretic.       Recent Labs      02/27/18   1506   WBC  7.3   RBC  3.66*   HEMOGLOBIN  12.0*   HEMATOCRIT  35.9*   MCV  98.1*   MCH  32.8   MCHC  33.4*   RDW  47.7   PLATELETCT  225   MPV  8.7*     Recent Labs      02/27/18   1506   SODIUM  137   POTASSIUM  4.0   CHLORIDE  103   CO2  27   GLUCOSE  100*   BUN  15   CREATININE  0.70   CALCIUM  9.0                      Assessment/Plan     * Stroke (CMS-HCC)   Assessment & Plan    CT with left sided parietal and temporal lobe stroke  Patient with very poor prognosis for recovery based on neurology, previous physician was told by daugther she wants to pursue further teatment, however patient's advance directive says no tube feeds, and DNR.   MRI showed  acute and subacute strokes in multiple lobes  Echocardiogram: LVEF 75% no abnormalities.   Aspirin, statin cont for neuroprotective measures  Clinical exam unchanged.  Neurology signed off   Updated patient's daughter on MRI results and poor prognosis. She agreed on transitioning to comfort care. Patient is comfort care now        Comfort measures only status   Overview    Total time spent on advanced care planning, excluding time spent on daily care: 11 minutes.    Patient transitioned to comfort care, in agreement with the patient's AD and DPOA wishes     1st 30 minutes [id number]   Each add’l 30 minutes [id number]           COPD (chronic obstructive pulmonary disease) (CMS-HCC)- (present on admission)   Assessment & Plan    not in acute exacerbation currently          Essential hypertension, benign- (present on admission)   Assessment & Plan    Discontinue blood pressure medications. Stop monitoring blood pressure  Patient is comfort care now         Patient plan of care discussed at multidisplinary team rounds and with patient and R.N at beside.    Quality-Core Measures   Reviewed items::  Labs reviewed, Radiology images reviewed and Medications reviewed  Barnett catheter::  No Barnett  DVT prophylaxis pharmacological::  Heparin  DVT prophylaxis - mechanical:  SCDs

## 2018-02-28 NOTE — CARE PLAN
Problem: Safety  Goal: Will remain free from falls  Outcome: PROGRESSING AS EXPECTED  Patient free of falls and fall precautions in place.    Problem: Infection  Goal: Will remain free from infection  Outcome: PROGRESSING AS EXPECTED  Handwashing education given and handwashing done prior to all patient care. Patient free from s/s of infection.

## 2018-02-28 NOTE — THERAPY
Pt has transitioned to comfort care. PT to sign off at this time. Please reorder PT should pt's status change. Thank you    Yolanda George, PT, DPT Pager: [phone]

## 2018-02-28 NOTE — PALLIATIVE CARE
Palliative Care follow-up  PC RN received updates from Dr. Rizo, will need PC to follow up regarding GOC and hospice options.    Called Kristen ([phone]), no answer and voicemail not set up.    Called Caren [address] DPOA ([phone]) she is pt's Mother-In-Law, discussed needing to have a conference with pt's dtr, she will try and get into contact with Kristen and have her call PC RN. Caren does not want to make decisions as pt is her son-in-law and it should be his daughter Kristen. Caren asked how pt executed an AD if he can't talk or write, PC RN reviewed AD and explained that pt made his lupe with a thumb print next to it in front of a notary on 2/8/18, Caren does not agree with that but will try and get Kristen to call PC RN back.    Updated bedside RN, she will notify PC RN if family comes to bedside.       Updated:   Bedside RN, Judi     Plan:  Discuss pt's AD and GOC with dtr Kristen once available.     Recommendations: I recommend an ethics consult.    Thank you for allowing Palliative Care to participate in this patient's care. Please feel free to call [phone] with any questions or concerns.

## 2018-02-28 NOTE — DISCHARGE PLANNING
Followed up with [facility]). Referral currently under review. Per staff, they accept patients on comfort care on a case by case bases. Mandie will call this [facility] with any updates. Attempted to follow up with  [facility], however, no answer. Voicemail left for Sirisha.

## 2018-02-28 NOTE — PROGRESS NOTES
Assumed care of pt at 0700.   Pt is nonverbal and does not follow commands.   Medication given per MAR.   Coretrack in place in L nare, no tube feeding order at this time.  Tele monitor in place.   Q2 turning in place.   Condom cath in place.   Plan of care discussed.   All questions answered at this time.

## 2018-03-01 NOTE — DISCHARGE PLANNING
Followed up with [facility]. They do no have comfort care bed available at this time. Attempted to follow up with [place], however, no answer. Voicemail left for Sirisha.

## 2018-03-01 NOTE — DISCHARGE PLANNING
Referral extended to [facility] and all [place] SNFs per Judi's([employer]) request.

## 2018-03-01 NOTE — DISCHARGE PLANNING
[facility] and [facility] have declined patient due to no comfort care bed availability.

## 2018-03-01 NOTE — DISCHARGE PLANNING
Received call back from Sirisha at hospitals. Per Sirisha, they are declining patient as they are unable to accommodate comfort care needs.

## 2018-03-01 NOTE — THERAPY
Patient is now comfort care. Will d/c SLP services at the acute level of care. Please re-consult with change in status.

## 2018-03-01 NOTE — THERAPY
Occupational Therapy Contact Note    Pt has now transitioned to comfort care. Will d/c OT services at this time. Please reorder should POC change.     Peg Regan, OTR/L

## 2018-03-01 NOTE — DISCHARGE PLANNING
[facility] currently reviewing referral. Palliative RN aware. SW called pt's daughter. No answer and unable to leave .

## 2018-03-01 NOTE — DISCHARGE PLANNING
REX notified by [facility] that [facility] and [facility] do not have comfort care beds available at this time. REX notified pt's daughter. Daughter requesting referral be sent to [facility] and then Chloe to [facility]. Verbal auth received.     REX notified [facility]. Choice faxed to [facility].

## 2018-03-02 NOTE — CARE PLAN
Problem: Safety  Goal: Will remain free from injury  Outcome: PROGRESSING AS EXPECTED  Bed alarm in place    Problem: Skin Integrity  Goal: Risk for impaired skin integrity will decrease  Outcome: PROGRESSING AS EXPECTED  q 2 hr turn      Problem: Safety:  Goal: Will remain free from injury  Outcome: PROGRESSING AS EXPECTED  Bed alarm in place

## 2018-03-02 NOTE — PROGRESS NOTES
Pt lethargic, no speech, does not follow commands. Incontinent of bladder, condom cath in place. Q 2 hr turn continued. No S/S of pain. Not able to drink much of full liquid tray. Mouth swabs given.

## 2018-03-02 NOTE — PROGRESS NOTES
[facility]    Date of Service: 3/1/2018    Chief Complaint  83 y.o. 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 Lethargic. Minimal active movements. Not in pain.  Breathing is not elaborated. Does not answer questions.   Patient made comfort care yesterday.     Consultants/Specialty  Neurology  Palliative care    Disposition  TBD        Review of Systems   Unable to perform ROS: Patient nonverbal      Physical Exam  Laboratory/Imaging   Hemodynamics  Temp (24hrs), Av.8 °C (98.2 °F), Min:36.6 °C (97.9 °F), Max:36.9 °C (98.4 °F)   Temperature: 36.6 °C (97.9 °F)  Pulse  Av.8  Min: 64  Max: 114   Blood Pressure : 128/57      Respiratory      Respiration: 16, Pulse Oximetry: (!) 86 % (R.N. notified)        RUL Breath Sounds: Expiratory Wheezes, RML Breath Sounds: Diminished, RLL Breath Sounds: Diminished, MIRYAM Breath Sounds: Expiratory Wheezes, LLL Breath Sounds: Diminished    Fluids    Intake/Output Summary (Last 24 hours) at 18 1901  Last data filed at 18 1100   Gross per 24 hour   Intake                0 ml   Output             1250 ml   Net            -1250 ml       Nutrition  Orders Placed This Encounter   Procedures   • DIET ORDER     Standing Status:   Standing     Number of Occurrences:   1     Order Specific Question:   Diet:     Answer:   Full Liquid [11]     Comments:   adat to pt preference   exam unchanged from yesterday  Physical Exam   Constitutional: No distress.   HENT:   Head: Normocephalic and atraumatic.   Eyes: Conjunctivae are normal. Pupils are equal, round, and reactive to light. Right eye exhibits no discharge. Left eye exhibits no discharge. No scleral icterus.   Neck: No thyromegaly present.   Cardiovascular: Intact distal pulses.    No murmur heard.  Pulmonary/Chest: No stridor. No respiratory distress. He has no wheezes. He has no rales.    Abdominal: Soft. Bowel sounds are normal. He exhibits no distension. There is no tenderness. There is no rebound.   Musculoskeletal: He exhibits no edema.   Neurological: He is alert.   Does not follow commands, tracks movement at times.    Skin: Skin is warm. He is not diaphoretic.       Recent Labs      02/27/18   1506   WBC  7.3   RBC  3.66*   HEMOGLOBIN  12.0*   HEMATOCRIT  35.9*   MCV  98.1*   MCH  32.8   MCHC  33.4*   RDW  47.7   PLATELETCT  225   MPV  8.7*     Recent Labs      02/27/18   1506   SODIUM  137   POTASSIUM  4.0   CHLORIDE  103   CO2  27   GLUCOSE  100*   BUN  15   CREATININE  0.70   CALCIUM  9.0                      Assessment/Plan     * Stroke (CMS-HCC)   Assessment & Plan    CT with left sided parietal and temporal lobe stroke  Patient with very poor prognosis for recovery based on neurology, previous physician was told by daugther she wants to pursue further teatment, however patient's advance directive says no tube feeds, and DNR.   MRI showed  acute and subacute strokes in multiple lobes  Echocardiogram: LVEF 75% no abnormalities.   Aspirin, statin cont for neuroprotective measures  Clinical exam unchanged.  Neurology signed off   Updated patient's daughter on MRI results and poor prognosis. She agreed on transitioning to comfort care. Patient is comfort care now        Comfort measures only status   Overview    Total time spent on advanced care planning, excluding time spent on daily care: 11 minutes.    Patient transitioned to comfort care, in agreement with the patient's AD and DPOA wishes     1st 30 minutes 49330   Each add’l 30 minutes 15017           COPD (chronic obstructive pulmonary disease) (CMS-HCC)- (present on admission)   Assessment & Plan    not in acute exacerbation currently          Essential hypertension, benign- (present on admission)   Assessment & Plan    Discontinue blood pressure medications. Stop monitoring blood pressure  Patient is comfort care now        Patient  plan of care discussed at multidisplinary team rounds and with patient and R.N at beside.    Quality-Core Measures   Reviewed items::  Labs reviewed, Radiology images reviewed and Medications reviewed  Barnett catheter::  No Barnett  DVT prophylaxis pharmacological::  Heparin  DVT prophylaxis - mechanical:  SCDs

## 2018-03-02 NOTE — CARE PLAN
Problem: Skin Integrity  Goal: Skin Integrity is maintained    Intervention: Turn every 2 hours  Q2 turns in place       Problem: Oxygenation/Respiratory Function  Goal: Respiratory Function supported  O2 sats stable on room air     Problem: Pain  Goal: Alleviation of Pain or a reduction in pain to the patient's comfort goal  Looks comfortable, pillows used for repositioning

## 2018-03-02 NOTE — PROGRESS NOTES
Pt on comfort care measures, non verbal, not following commands. Turn Q2hrs. Condom cath in place for incontinence.

## 2018-03-03 NOTE — DISCHARGE PLANNING
Medical Social Worker    REX discussed pt with [facility] SW who suggested that we have pt assessed for [facility]. REX discussed this with attending MD and requested a hospice order. REX faxed CHOICE for [facility] to [facility].

## 2018-03-03 NOTE — DISCHARGE PLANNING
Medical Social Worker    Pt discussed in rounds. Pt remains pending SNF acceptance for a comfort care bed.

## 2018-03-03 NOTE — DISCHARGE PLANNING
Received call from Elham with Palliative Care, family doesn't wish Hospice services. [facility] Nurse Lance advised.

## 2018-03-03 NOTE — DISCHARGE PLANNING
Medical Social Worker    Care Conference occurred on 3/3/18 at 1450.     Attendees:  Ethicist Luis Antonio Walden  Attending MD Dr. Rizo  Charge RN Elise Sweet  Hospitalist RN  Palliative RN Mercedes    Intervention:    Medical Team and pt's Daughter/POA Kristen Pack met in the conference room to discuss pt's Comfort Care status and DC Planning. Pt's daughter stated that she is confused by comfort care status and would like her father to eat. Ethicist, attending MD, and Palliative RN Mercedes explained Comfort Care Measures and Kristen became agreeable to this code status again as it goes along with her father's wishes in his AD.     SW discussed DC Planning with Kristen Pack. She stated that at this moment she does not believe that Home with Hospice is possible right now, but she needs her father as close to home as possible. She requested that a referral be sent to [facility] SNF as she lives in [place], [place] and that is the closest SNF to her.     Kristen requested that Hospice referrals be sent one at a time to: 1- [facility], 2- [facility], and 3- [facility], as it is unknown if these agencies service the [facility] where [facility] is.    REX explained SNF with Hospice insurance requirement. Pt has Medicare primary to pay for Hospice, and pt has [employer] secondary. Kristen is unsure if the [employer] is a LTC plan. REX explained that the [facility] pay would need to be covered by a secondary insurance.     Plan:  REX faxed CHOICE for HH and SNF to [facility]. SW to continue following up with Kristen in regards to DC Placement status. Kristen to reach out to [facility] to ask for assistance from them, especially if [employer] will not cover [facility] stay. DC pt to accepting facility once one is obtained.

## 2018-03-03 NOTE — DISCHARGE PLANNING
Palliative Social Work    Per request of PC RNMercedes, this SW spoke with transitional SW, Nicolas, earlier today to explore the possibility of pt being referred to hospice since pt has been on comfort care since Monday.  Agreed that this SW would f/u with family.  This SW met with pts daughter and DPOA, Kristen, at bedside when she arrived to the hospital.  There were also several other family members at bedside. Kristen and family appeared upset and wanted to know why pt was not receiving tube feedings and IV fluids. Informed the family that this SW would contact PC RN, Mercedes, to f/u with them since she has been following pt and participated in a meeting with Kristen on Monday.  Requested that Mercedes f/u with the family.  Updated Nicolas that Mercedes will be following up with the family and that hospice referral is on hold at this time.

## 2018-03-03 NOTE — DISCHARGE PLANNING
Received choice form from [facility] Nicolas.  Referral sent to [facility] with copy of choice form faxed to [facility].  Hospice nurse Lance advised of hospice referral.

## 2018-03-03 NOTE — PROGRESS NOTES
Pt arrived to the unit and appeared to be in no distress. Condom catheter and two Ivs in place and on Ra. Pt had no response to verbal commands and did minimal tracking. Pt did eat a couple bites of lunch but then quit opening his mouth for me. I left the tray in the room and let the granddaughter know that she could try and feed him if he let her. Call light within reach and family told to call if they need anything else at this time.

## 2018-03-03 NOTE — PALLIATIVE CARE
Palliative Care follow-up  PC RN visted with family to discuss hospice, family expressed that they want pt to have tube feedings, they do not want pt to starve.     PC RN with Dr. Rizo, Nicolas GOODMAN, Berkley hospitalist DANNI and Luis Antonio Hopkins met with Kristen in conference room to discuss concerns about pt's care. MD explained the process of dying and tube feedings. PC RN explained that TF could cause discomfort rather than comfort. Luis Antonio discussed pt's advanced directive and expressed wishes. Kristen feels a little better in understanding the process of dying and the reason for not providing artificial nutrition. Nicolas GOODMAN is going to work with Kristen to get pt to [facility] so he can be closer to home. Discussed possible hospice services to support pt and family, Kristen is in agreement with any support she can receive.     Active listening and emotional support provided.       Plan:   Continue comfort care, NO TF, pending placement at [facility] for end-of-life care.    Recommendations: I recommend a hospice consult.    Thank you for allowing Palliative Care to participate in this patient's care. Please feel free to call [phone] with any questions or concerns.

## 2018-03-03 NOTE — PROGRESS NOTES
[facility] Progress Note    Date of Service: 3/2/2018    Chief Complaint  83 y.o. 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 Lethargic. Following  With gaze. Does not answer questions.  Breathing is not elaborated.   Currently pending acceptance by SNF    Consultants/Specialty  Neurology  Palliative care    Disposition  SNF when accepted        Review of Systems   Unable to perform ROS: Patient nonverbal      Physical Exam  Laboratory/Imaging   Hemodynamics  Temp (24hrs), Av.9 °C (98.4 °F), Min:36.6 °C (97.8 °F), Max:37.2 °C (99 °F)   Temperature: 36.6 °C (97.8 °F)  Pulse  Av.9  Min: 54  Max: 114   Blood Pressure : 120/61      Respiratory      Respiration: 14, Pulse Oximetry: 92 %        RUL Breath Sounds: Expiratory Wheezes, RML Breath Sounds: Diminished, RLL Breath Sounds: Diminished, MIRYAM Breath Sounds: Expiratory Wheezes, LLL Breath Sounds: Diminished    Fluids  No intake or output data in the 24 hours ending 18 1651    Nutrition  Orders Placed This Encounter   Procedures   • DIET ORDER     Standing Status:   Standing     Number of Occurrences:   1     Order Specific Question:   Diet:     Answer:   Full Liquid [11]     Comments:   adat to pt preference   exam unchanged from yesterday  Physical Exam   Constitutional: No distress.   HENT:   Head: Normocephalic and atraumatic.   Eyes: Conjunctivae are normal. Pupils are equal, round, and reactive to light. Right eye exhibits no discharge. Left eye exhibits no discharge. No scleral icterus.   Neck: No thyromegaly present.   Cardiovascular: Intact distal pulses.    No murmur heard.  Pulmonary/Chest: No stridor. No respiratory distress. He has no wheezes. He has no rales.   Abdominal: Soft. Bowel sounds are normal. He exhibits no distension. There is no tenderness. There is no rebound.   Musculoskeletal: He exhibits no edema.    Neurological: He is alert.   Does not follow commands, tracks movement at times.    Skin: Skin is warm. He is not diaphoretic.                                Assessment/Plan     * Stroke (CMS-HCC)   Assessment & Plan    CT with left sided parietal and temporal lobe stroke  Patient with very poor prognosis for recovery based on neurology, previous physician was told by daugther she wants to pursue further teatment, however patient's advance directive says no tube feeds, and DNR.   MRI showed  acute and subacute strokes in multiple lobes  Echocardiogram: LVEF 75% no abnormalities.   Aspirin, statin cont for neuroprotective measures  Clinical exam unchanged.  Neurology signed off   Updated patient's daughter on MRI results and poor prognosis. She agreed on transitioning to comfort care. Patient is comfort care now        Comfort measures only status   Overview    Total time spent on advanced care planning, excluding time spent on daily care: 11 minutes.    Patient transitioned to comfort care, in agreement with the patient's AD and DPOA wishes     1st 30 minutes 00223   Each add’l 30 minutes 39410           COPD (chronic obstructive pulmonary disease) (CMS-HCC)- (present on admission)   Assessment & Plan    not in acute exacerbation currently          Essential hypertension, benign- (present on admission)   Assessment & Plan    Discontinue blood pressure medications. Stop monitoring blood pressure  Patient is comfort care now        Patient plan of care discussed at multidisplinary team rounds and with patient and R.N at beside.    Quality-Core Measures   Reviewed items::  Labs reviewed, Radiology images reviewed and Medications reviewed  Barnett catheter::  No Barnett  DVT prophylaxis pharmacological::  Heparin  DVT prophylaxis - mechanical:  SCDs

## 2018-03-03 NOTE — CONSULTS
ETHICS CASE DISCUSSION  Patient Name: Nick Billings  MRN: [id number]  DATE OF SERVICE: 3/3/18    ETHICAL ISSUE:   An ethics consultation was requested for this patient.  The ethical issues are around health care agent and family decision making.     DISCUSSIONS WITH MEDICAL/CARE TEAM:   A care conference was held on Wednesday 2/28/18 with the medical team and the patient’s daughter and designated power of  (DPOA). The medical team discussed the patient’s poor prognosis following the patient’s most recent stroke.  The medical team and daughter (DPOA) agreed with comfort care only, no artificial nutrition, and discharge at a facility closer to home under comfort care only or hospice.      Ethics was called on 3/3/18 concerning family and DPOA concerns about not providing artificial nutrition.  Ethics joined conference call with medical team and daughter (DPOA) and explained providing artificial nutrition would be against the clinical indications, per the medical team, and in conflict with the patient’s advance directive.  Daughter (DPOA) expressed understanding with medical team’s recommendation to continue forward on comfort care only with discharge closer to home under FDC care with hospice or comfort care only depending on bed availability.    RECOMMENDATIONS:   1) Recommend, if the patient’s daughter (DPOA) or family again request artificial nutrition that is in conflict with medical indications, and against the patient’s advance directive, further discussion between medical team and ethics, as well as other hospital resources, about potential treatment options.  a. One option may be to provide NG tube for reasonable period of time (3-5 days) in order for the patient’s family to seek transfer to another health care institution that will provide treatment requested by the patient’s daughter (DPOA) and family, or for the patient’s daughter (DPOA) and family to seek injunction requiring artificial  nutrition against clinical indications and the patient’s advance directive.  It would be ethically appropriate to remove the NG tube after a reasonable period of time once it is determined that transfer is not feasible and court injunction is not sought.  Ethics is available for further discussion if necessary.  b. Of note, DPOA has decision making authority over other family members if conflict occurs.    Thank you for involving us in the care of this patient. Please let me know if you have any other questions or concerns.      Respectfully submitted,    Luis Antonio Steel JD, MA  Bioethicist  [phone]

## 2018-03-03 NOTE — DISCHARGE PLANNING
Per CCT Nicolas request, referral sent to [facility] per REX Preciado request.  Referral sent [phone] on 03-03-18.

## 2018-03-04 NOTE — CARE PLAN
Problem: Respiratory:  Goal: Respiratory status will improve  Pt on RA, respiration even and non labored. No respiratory distress noted.     Problem: Skin Integrity  Goal: Risk for impaired skin integrity will decrease  Waffle mattress in place. Q2 turns implemented. Condom cath in place. Pads changed PRN. Barrier paste in use.

## 2018-03-04 NOTE — PROGRESS NOTES
Pt unable to communicate verbally and unable to follow commands. Pt does not appear to be in any pain or distress.    Bed alarm on, call light within reach.    Family at bedside.

## 2018-03-04 NOTE — PROGRESS NOTES
[facility] Hospitalist Progress Note    Date of Service: 3/3/2018    Chief Complaint  83 y.o. 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 Lethargy continues.. Tracks minimally. Does not answer questions. does not appear to be in acute distress.     Family members requested to start artificial feeding through PEG tube. Palliative care RN Alysa,  Cirilo, along with Luis Antonio/biotayler , discussed situation with dip UA, patient's daughter. After explaining her dying process and futility of artificial feeding, she agreed to continue on comfort care without artificial nutrition. She understands that it will go against patient's wish, expressed in advanced directives.    Currently pending acceptance by SNF or hospice.    Consultants/Specialty  Neurology  Palliative care    Disposition  SNF when accepted        Review of Systems   Unable to perform ROS: Patient nonverbal      Physical Exam  Laboratory/Imaging   Hemodynamics  Temp (24hrs), Av.4 °C (97.6 °F), Min:36.4 °C (97.5 °F), Max:36.5 °C (97.7 °F)   Temperature: 36.4 °C (97.5 °F)  Pulse  Av.8  Min: 54  Max: 114   Blood Pressure : 130/64      Respiratory      Respiration: 18, Pulse Oximetry: 90 %        RUL Breath Sounds: Diminished, RML Breath Sounds: Diminished, RLL Breath Sounds: Diminished, MIRYAM Breath Sounds: Diminished, LLL Breath Sounds: Diminished    Fluids    Intake/Output Summary (Last 24 hours) at 18  Last data filed at 18 193   Gross per 24 hour   Intake                0 ml   Output             1250 ml   Net            -1250 ml       Nutrition  Orders Placed This Encounter   Procedures   • DIET ORDER     Standing Status:   Standing     Number of Occurrences:   1     Order Specific Question:   Diet:     Answer:   Full Liquid [11]     Comments:   adat to pt preference   exam unchanged from yesterday  Physical  Exam   Constitutional: No distress.   HENT:   Head: Normocephalic and atraumatic.   Eyes: Conjunctivae are normal. Pupils are equal, round, and reactive to light. Right eye exhibits no discharge. Left eye exhibits no discharge. No scleral icterus.   Neck: No thyromegaly present.   Cardiovascular: Intact distal pulses.    No murmur heard.  Pulmonary/Chest: No stridor. No respiratory distress. He has no wheezes. He has no rales.   Abdominal: Soft. Bowel sounds are normal. He exhibits no distension. There is no tenderness. There is no rebound.   Musculoskeletal: He exhibits no edema.   Neurological: He is alert.   Does not follow commands, tracks movement at times.    Skin: Skin is warm. He is not diaphoretic.                                Assessment/Plan     * Stroke (CMS-[facility])   Assessment & Plan    CT with left sided parietal and temporal lobe stroke  Patient with very poor prognosis for recovery based on neurology, previous physician was told by daugther she wants to pursue further teatment, however patient's advance directive says no tube feeds, and DNR.   MRI showed  acute and subacute strokes in multiple lobes  Echocardiogram: LVEF 75% no abnormalities.   Aspirin, statin cont for neuroprotective measures  Clinical exam unchanged.  Neurology signed off   Updated patient's daughter on MRI results and poor prognosis. She agreed on transitioning to comfort care. Patient is comfort care now        Comfort measures only status   Overview    Total time spent on advanced care planning, excluding time spent on daily care: 11 minutes.    Patient transitioned to comfort care, in agreement with the patient's AD and DPOA wishes     1st 30 minutes 27698   Each add’l 30 minutes [id number]           Assessment & Plan    Palliative care RN Alysa,  Cirilo, along with Luis Antonio/biotayler , repeatedly discussed situation with DPOA (patient's daughter) on 3/3/18. After explaining her dying process and futility of  artificial feeding, she agreed to continue on comfort care without artificial nutrition. She understands that it will go against patient's wish, expressed in advanced directives.          COPD (chronic obstructive pulmonary disease) (CMS-HCC)- (present on admission)   Assessment & Plan    not in acute exacerbation currently          Essential hypertension, benign- (present on admission)   Assessment & Plan    Discontinue blood pressure medications. Stop monitoring blood pressure  Patient is comfort care now        Patient plan of care discussed at multidisplinary team rounds and with patient and R.N at beside.    Quality-Core Measures   Reviewed items::  Labs reviewed, Radiology images reviewed and Medications reviewed  Barnett catheter::  No Barnett  DVT prophylaxis pharmacological::  Heparin  DVT prophylaxis - mechanical:  SCDs

## 2018-03-04 NOTE — PROGRESS NOTES
Received bedside report and accepted care of pt. Pt currently resting in bed no visible or stated distress. Pt non-verbal and no response to verbal commands though follows with gaze. Respiration even and non labored. Condom cath in place. Bed control on and bed in locked in position. Waffle mattress in place. Q2 turns implemented. Yellow treaded socks on. Call light within reach. Hourly rounding in place.

## 2018-03-05 NOTE — CARE PLAN
Problem: Knowledge Deficit  Goal: Knowledge of disease process/condition, treatment plan, diagnostic tests, and medications will improve  Pt and pt's family updated on plan of care.    Problem: Skin Integrity  Goal: Risk for impaired skin integrity will decrease  Q2H turns implemented, waffle mattress in place, pillows used for support and to float heels, pt routinely monitored for episodes of incontinence, barrier paste used, mepilex in place.    Problem: Knowledge Deficit:  Goal: Knowledge of disease process/condition, treatment plan, diagnostic tests, and medications will improve  Pt and pt's family updated on plan of care.

## 2018-03-05 NOTE — PROGRESS NOTES
Received report from day shift rn pt is comfort care. Non verbal no signs of distress noted at this time

## 2018-03-05 NOTE — PROGRESS NOTES
2 RN skin check performed with Charge RN Morteza. Pt on comfort care. Unable to assess pt's back as pt was grimacing when attempted to turn pt to view back. No abnormal findings noted.

## 2018-03-05 NOTE — PROGRESS NOTES
Pt unable to communicate verbally and unable to follow commands. Pt does not appear to be in any distress or pain.    Bed alarm on, call light within reach. Pt educated on importance of using the call light when he needs assistance. No evidence of understanding.

## 2018-03-05 NOTE — DISCHARGE PLANNING
Medical Social Work    Referral:  Rounds    Intervention:  Pt discussed during rounds.  Pt is awaiting acceptance to [facility] (SNF) with hospice to follow.    Plan: SW will remain available for dc planning    Add:  SW made tc to kp Peterson ([phone]) no option to leave vm

## 2018-03-05 NOTE — DISCHARGE PLANNING
Received call from Milana at [facility], they do not service patients home area.

## 2018-03-06 NOTE — PROGRESS NOTES
[facility] Hospitalist Progress Note    Date of Service: 3/6/2018    Chief Complaint  83 y.o. 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    Consultants/Specialty  Neurology  Palliative care    Disposition  SNF with hospice bed when accepted        Review of Systems   Unable to perform ROS: Patient nonverbal      Physical Exam  Laboratory/Imaging   Hemodynamics  Temp (24hrs), Av.4 °C (97.6 °F), Min:36.3 °C (97.3 °F), Max:36.6 °C (97.8 °F)   Temperature: 36.6 °C (97.8 °F)  Pulse  Av.4  Min: 54  Max: 114   Blood Pressure : 157/90      Respiratory      Respiration: 16, Pulse Oximetry: (!) 86 %        RUL Breath Sounds: Diminished, RML Breath Sounds: Diminished, RLL Breath Sounds: Diminished, MIRYAM Breath Sounds: Diminished, LLL Breath Sounds: Diminished    Fluids    Intake/Output Summary (Last 24 hours) at 18 1542  Last data filed at 18 0600   Gross per 24 hour   Intake                0 ml   Output              500 ml   Net             -500 ml       Nutrition  Orders Placed This Encounter   Procedures   • DIET ORDER     Standing Status:   Standing     Number of Occurrences:   1     Order Specific Question:   Diet:     Answer:   Full Liquid [11]     Comments:   adat to pt preference   exam unchanged from yesterday  Physical Exam   Constitutional: No distress.   HENT:   Mouth/Throat: No oropharyngeal exudate.   Eyes: Right eye exhibits no discharge. Left eye exhibits no discharge. No scleral icterus.   Neck: Normal range of motion. Neck supple. No tracheal deviation present.   Cardiovascular: Normal rate, regular rhythm, normal heart sounds and intact distal pulses.  Exam reveals no gallop and no friction rub.    No murmur heard.  Pulmonary/Chest: Effort normal and breath sounds normal. No stridor. No respiratory distress. He has no wheezes. He has no rales. He  exhibits no tenderness.   Abdominal: Soft. Bowel sounds are normal. He exhibits no distension and no mass. There is no tenderness. There is no rebound and no guarding.   Musculoskeletal: He exhibits no edema, tenderness or deformity.   Neurological: He is unresponsive. No cranial nerve deficit. He exhibits normal muscle tone. Coordination normal.   Does not follow commands, tracks movement at times.    Skin: Skin is warm and dry. No rash noted. He is not diaphoretic. No erythema. No pallor.   Nursing note and vitals reviewed.                               Assessment/Plan     * Stroke (CMS-HCC)   Assessment & Plan    CT with left sided parietal and temporal lobe stroke  On comfort care  [facility]wait[facility]        Comfort measures only status   Overview           Assessment & Plan              COPD (chronic obstructive pulmonary disease) (CMS-HCC)- (present on admission)   Assessment & Plan    not in acute exacerbation currently              Quality-Core Measures   Reviewed items::  Labs reviewed, Radiology images reviewed and Medications reviewed  Barnett catheter::  No Barnett  DVT prophylaxis pharmacological::  Heparin  DVT prophylaxis - mechanical:  SCDs

## 2018-03-06 NOTE — PROGRESS NOTES
Report received from Sean RN, assumed pt care. Abbreviated assessment completed for comfort care.  POC is comfort measures only. Pt's son is at bedside. No further needs at this time.  Call light within reach, fall precautions in place.  Will continue to monitor.

## 2018-03-06 NOTE — PROGRESS NOTES
[facility]ist Progress Note    Date of Service: 3/5/2018    Chief Complaint  83 y.o. 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 Lethargy improved.. Tracks with gaze. Does not answer questions. does not appear to be in acute distress.     Currently pending acceptance by SNF or hospice.    Consultants/Specialty  Neurology  Palliative care    Disposition  SNF when accepted        Review of Systems   Unable to perform ROS: Patient nonverbal      Physical Exam  Laboratory/Imaging   Hemodynamics  Temp (24hrs), Av.6 °C (97.9 °F), Min:36.4 °C (97.6 °F), Max:36.7 °C (98.1 °F)   Temperature:  (Q shift V/S)  Pulse  Av.7  Min: 54  Max: 114   Blood Pressure : 139/62      Respiratory      Respiration: 16, Pulse Oximetry: 98 %        RUL Breath Sounds: Diminished, RML Breath Sounds: Diminished, RLL Breath Sounds: Diminished, MIRYAM Breath Sounds: Diminished, LLL Breath Sounds: Diminished    Fluids    Intake/Output Summary (Last 24 hours) at 18 193  Last data filed at 18 1749   Gross per 24 hour   Intake                0 ml   Output              500 ml   Net             -500 ml       Nutrition  Orders Placed This Encounter   Procedures   • DIET ORDER     Standing Status:   Standing     Number of Occurrences:   1     Order Specific Question:   Diet:     Answer:   Full Liquid [11]     Comments:   adat to pt preference   exam unchanged from yesterday  Physical Exam   Constitutional: No distress.   HENT:   Head: Normocephalic and atraumatic.   Eyes: Conjunctivae are normal. Pupils are equal, round, and reactive to light. Right eye exhibits no discharge. Left eye exhibits no discharge. No scleral icterus.   Neck: No thyromegaly present.   Cardiovascular: Intact distal pulses.    No murmur heard.  Pulmonary/Chest: No stridor. No respiratory distress. He has no wheezes. He has no rales.   Abdominal: Soft.  Bowel sounds are normal. He exhibits no distension. There is no tenderness. There is no rebound.   Musculoskeletal: He exhibits no edema.   Neurological: He is alert.   Does not follow commands, tracks movement at times.    Skin: Skin is warm. He is not diaphoretic.                                Assessment/Plan     * Stroke (CMS-HCC)   Assessment & Plan    CT with left sided parietal and temporal lobe stroke  Patient with very poor prognosis for recovery based on neurology, previous physician was told by daugther she wants to pursue further teatment, however patient's advance directive says no tube feeds, and DNR.   MRI showed  acute and subacute strokes in multiple lobes  Echocardiogram: LVEF 75% no abnormalities.   Aspirin, statin cont for neuroprotective measures  Clinical exam unchanged.  Neurology signed off   Updated patient's daughter on MRI results and poor prognosis. She agreed on transitioning to comfort care. Patient is comfort care now        Comfort measures only status   Overview    Total time spent on advanced care planning, excluding time spent on daily care: 11 minutes.    Patient transitioned to comfort care, in agreement with the patient's AD and DPOA wishes     1st 30 minutes [id number]   Each add’l 30 minutes [id number]           Assessment & Plan    Palliative care RN Alysa,  Cirilo, along with Luis Antonio/bioethmagdaleno , repeatedly discussed situation with DPOA (patient's daughter) on 3/3/18. After explaining her dying process and futility of artificial feeding, she agreed to continue on comfort care without artificial nutrition. She understands that it will go against patient's wish, expressed in advanced directives.          COPD (chronic obstructive pulmonary disease) (CMS-HCC)- (present on admission)   Assessment & Plan    not in acute exacerbation currently          Essential hypertension, benign- (present on admission)   Assessment & Plan    Discontinue blood pressure medications.  Stop monitoring blood pressure  Patient is comfort care now        Patient plan of care discussed at multidisplinary team rounds and with patient and R.N at beside.    Quality-Core Measures   Reviewed items::  Labs reviewed, Radiology images reviewed and Medications reviewed  Barnett catheter::  No Barnett  DVT prophylaxis pharmacological::  Heparin  DVT prophylaxis - mechanical:  SCDs

## 2018-03-06 NOTE — PROGRESS NOTES
Received report from day shift. Pt is lying in bed not floowing comands non verbal. Able to track minimally . Comfort care

## 2018-03-06 NOTE — CARE PLAN
Problem: Skin Integrity  Goal: Skin Integrity is maintained    Intervention: Turn every 2 hours  Pt repositioned for comfort  Intervention: Protect pressure points  Pt developing redness at some pressure points, repositioned and protected as able      Problem: Pain  Goal: Alleviation of Pain or a reduction in pain to the patient's comfort goal    Intervention: Pain Management--Non Pharmacologic techniques. Examples: Relaxation, Distraction, Play Therapy, Activity Therapy, Massage  Pt does not appear to be in any pain at this time - repositioned, rest, family at bedside, nutrition offered

## 2018-03-06 NOTE — DISCHARGE PLANNING
Contacted [facility] regarding hospice bed availability and hospice services available. Spoke to staff who states she is unsure if they offer hospice services, however, she will call this [facility] back.

## 2018-03-06 NOTE — CARE PLAN
Problem: Knowledge Deficit  Goal: Knowledge of disease process/condition, treatment plan, diagnostic tests, and medications will improve  Pt and pt's family updated on plan of care.    Problem: Skin Integrity  Goal: Risk for impaired skin integrity will decrease  Q2H turns in place, waffle mattress in place, 2 RN skin check completed, mepilex in place, extra pillows used for support and to float heels, pt routinely monitored for episodes of incontinence, barrier wipes used.    Problem: Knowledge Deficit:  Goal: Knowledge of disease process/condition, treatment plan, diagnostic tests, and medications will improve  Pt and pt's family updated on plan of care.

## 2018-03-06 NOTE — DISCHARGE PLANNING
Medical Social Work    Referral:  Rounds    Intervention:  Pt discussed during rounds. DC plan is for pt to transfer to [facility]. Awaiting word back from [facility] if they can provide hospice services.      Plan:  SW will remain available for dc planning    Add @1600:  REX made tc to pt Kristen goodrich ([phone]) no option to leave vm    If Charge RN see dt at bedside REX will be notified.

## 2018-03-06 NOTE — PROGRESS NOTES
[facility]    Date of Service: 3/5/2018    Chief Complaint  83 y.o. 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    Interval Problem Update    Unable to communicate . Tracks with gaze. Does not answer questions. Lethargic. does not appear to be in acute distress.     Currently pending acceptance by SNF with hospice.    Consultants/Specialty  Neurology  Palliative care    Disposition  SNF with hospice bed when accepted        Review of Systems   Unable to perform ROS: Patient nonverbal      Physical Exam  Laboratory/Imaging   Hemodynamics  Temp (24hrs), Av.6 °C (97.9 °F), Min:36.4 °C (97.6 °F), Max:36.7 °C (98.1 °F)   Temperature:  (Q shift V/S)  Pulse  Av.7  Min: 54  Max: 114   Blood Pressure : 139/62      Respiratory      Respiration: 16, Pulse Oximetry: 98 %        RUL Breath Sounds: Diminished, RML Breath Sounds: Diminished, RLL Breath Sounds: Diminished, MIRYAM Breath Sounds: Diminished, LLL Breath Sounds: Diminished    Fluids    Intake/Output Summary (Last 24 hours) at 18 1937  Last data filed at 18 1749   Gross per 24 hour   Intake                0 ml   Output              500 ml   Net             -500 ml       Nutrition  Orders Placed This Encounter   Procedures   • DIET ORDER     Standing Status:   Standing     Number of Occurrences:   1     Order Specific Question:   Diet:     Answer:   Full Liquid [11]     Comments:   adat to pt preference   exam unchanged from yesterday  Physical Exam   Constitutional: No distress.   HENT:   Head: Normocephalic and atraumatic.   Eyes: Conjunctivae are normal. Pupils are equal, round, and reactive to light. Right eye exhibits no discharge. Left eye exhibits no discharge. No scleral icterus.   Neck: No thyromegaly present.   Cardiovascular: Intact distal pulses.    No murmur heard.  Pulmonary/Chest: No stridor. No respiratory distress. He has no wheezes. He has no rales.   Abdominal: Soft. Bowel sounds  are normal. He exhibits no distension. There is no tenderness. There is no rebound.   Musculoskeletal: He exhibits no edema.   Neurological: He is alert.   Does not follow commands, tracks movement at times.    Skin: Skin is warm. He is not diaphoretic.                                Assessment/Plan     * Stroke (CMS-HCC)   Assessment & Plan    CT with left sided parietal and temporal lobe stroke  Patient with very poor prognosis for recovery based on neurology, previous physician was told by daugther she wants to pursue further teatment, however patient's advance directive says no tube feeds, and DNR.   MRI showed  acute and subacute strokes in multiple lobes  Echocardiogram: LVEF 75% no abnormalities.   Aspirin, statin cont for neuroprotective measures  Clinical exam unchanged.  Neurology signed off   Updated patient's daughter on MRI results and poor prognosis. She agreed on transitioning to comfort care. Patient is comfort care now        Comfort measures only status   Overview    Total time spent on advanced care planning, excluding time spent on daily care: 11 minutes.    Patient transitioned to comfort care, in agreement with the patient's AD and DPOA wishes     1st 30 minutes [id number]   Each add’l 30 minutes [id number]           Assessment & Plan    Palliative care RN Alysa,  Cirilo, along with Luis Antonio/biotayler , repeatedly discussed situation with DPOA (patient's daughter) on 3/3/18. After explaining her dying process and futility of artificial feeding, she agreed to continue on comfort care without artificial nutrition. She understands that it will go against patient's wish, expressed in advanced directives.          COPD (chronic obstructive pulmonary disease) (CMS-HCC)- (present on admission)   Assessment & Plan    not in acute exacerbation currently          Essential hypertension, benign- (present on admission)   Assessment & Plan    Discontinue blood pressure medications. Stop  monitoring blood pressure  Patient is comfort care now        Patient plan of care discussed at multidisplinary team rounds and with patient and R.N at beside.    Quality-Core Measures   Reviewed items::  Labs reviewed, Radiology images reviewed and Medications reviewed  Barnett catheter::  No Barnett  DVT prophylaxis pharmacological::  Heparin  DVT prophylaxis - mechanical:  SCDs

## 2018-03-06 NOTE — CARE PLAN
Problem: Safety  Goal: Will remain free from injury  Outcome: PROGRESSING SLOWER THAN EXPECTED      Problem: Infection  Goal: Will remain free from infection  Outcome: PROGRESSING AS EXPECTED      Problem: Safety:  Goal: Risk of aspiration will decrease  Outcome: PROGRESSING SLOWER THAN EXPECTED  Pt is comfort care at this time  Goal: Will remain free from injury  Outcome: PROGRESSING SLOWER THAN EXPECTED      Problem: Mobility:  Goal: Capacity to carry out activities will improve  Outcome: PROGRESSING SLOWER THAN EXPECTED

## 2018-03-07 NOTE — PROGRESS NOTES
[facility] Hospitalist Progress Note    Date of Service: 3/7/2018    Chief Complaint  83 y.o. 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    Interval Problem Update  Alert, tracking with eyes  Non verbal  Unable to follow commands  Friend at bedside    Consultants/Specialty  Neurology  Palliative care    Disposition  SNF with hospice bed when accepted        Review of Systems   Unable to perform ROS: Patient nonverbal      Physical Exam  Laboratory/Imaging   Hemodynamics  Temp (24hrs), Av.2 °C (97.1 °F), Min:36.1 °C (96.9 °F), Max:36.2 °C (97.2 °F)   Temperature: 36.2 °C (97.2 °F)  Pulse  Av.3  Min: 54  Max: 114   Blood Pressure : 120/59      Respiratory      Respiration: 16, Pulse Oximetry: 90 %        RUL Breath Sounds: Diminished;Inspiratory Wheezes, RML Breath Sounds: Diminished;Inspiratory Wheezes, RLL Breath Sounds: Diminished;Inspiratory Wheezes, MIRYAM Breath Sounds: Diminished;Inspiratory Wheezes, LLL Breath Sounds: Diminished;Inspiratory Wheezes    Fluids  No intake or output data in the 24 hours ending 18 1054    Nutrition  Orders Placed This Encounter   Procedures   • DIET ORDER     Standing Status:   Standing     Number of Occurrences:   1     Order Specific Question:   Diet:     Answer:   Full Liquid [11]     Comments:   adat to pt preference   exam unchanged from yesterday  Physical Exam   Constitutional: No distress.   HENT:   Mouth/Throat: No oropharyngeal exudate.   Eyes: Right eye exhibits no discharge. Left eye exhibits no discharge. No scleral icterus.   Neck: Normal range of motion. Neck supple. No tracheal deviation present.   Cardiovascular: Normal rate, regular rhythm, normal heart sounds and intact distal pulses.  Exam reveals no gallop and no friction rub.    No murmur heard.  Pulmonary/Chest: Effort normal and breath sounds normal. No stridor. No respiratory distress. He has no wheezes. He has no rales. He exhibits no tenderness.   Abdominal: Soft.  Bowel sounds are normal. He exhibits no distension and no mass. There is no tenderness. There is no rebound and no guarding.   Musculoskeletal: He exhibits no edema, tenderness or deformity.   Neurological: No cranial nerve deficit. He exhibits normal muscle tone. Coordination normal.   Does not follow commands, tracks movement at times.    Skin: Skin is warm and dry. No rash noted. He is not diaphoretic. No erythema. No pallor.   Nursing note and vitals reviewed.                               Assessment/Plan     * Stroke (CMS-HCC)   Assessment & Plan    CT with left sided parietal and temporal lobe stroke  On comfort care  [facility]waiting [facility]        Comfort measures only status   Overview           Assessment & Plan              COPD (chronic obstructive pulmonary disease) (CMS-HCC)- (present on admission)   Assessment & Plan    not in acute exacerbation currently              Quality-Core Measures   Reviewed items::  Labs reviewed, Radiology images reviewed and Medications reviewed  Barnett catheter::  No Barnett  DVT prophylaxis pharmacological::  Heparin  DVT prophylaxis - mechanical:  SCDs

## 2018-03-07 NOTE — DISCHARGE PLANNING
Medical Social Work    Referral:  SNF/Hocpise    Intervention:  MARIAH has not heard back from [facility] as of yet.  [facility] as [facility] don't service the pt's home area.    REX faxed CHOICE for [facility], per past SW notes, to Deya LEMUS    Add @ 12:32pm:  REX was notified that [facility]  Declined pt as they do not have a hospice bed available.    REX requested that Deya LEMUS continue to f/u with local SNF's that are still pending acceptance.

## 2018-03-07 NOTE — CARE PLAN
Problem: Pain Management  Goal: Pain level will decrease to patient's comfort goal    Intervention: Follow pain managment plan developed in collaboration with patient and Interdisciplinary Team  Assessing pain level frequently using nonverbal descriptors.  Pt appears to be resting comfortably in bed. VSS. Providing non-pharmacological intervention, therapeutic communication.  Hourly rounding in practice.      Problem: Skin Integrity  Goal: Risk for impaired skin integrity will decrease    Intervention: Assess risk factors for impaired skin integrity and/or pressure ulcers  Dhruv scale being used to assess skin break down risks.  Providing assistance with repositioning.  Collaborating and communicating with health care team to prevent pressure ulcer. Q2 turns in place, waffle mattress in place, heels and elbows floating with pillows.  Hourly rounding in practice.

## 2018-03-07 NOTE — DISCHARGE PLANNING
Followed up with [facility]. Spoke to Dalila who states they are unable to accommodate hospice patients at this time and do not have hospice bed available. Mariely(REX) notified.

## 2018-03-07 NOTE — DISCHARGE PLANNING
Medical Social Work    Referral:  Rounds    Intervention:  Pt discussed during rounds.  Pt is medically clear and can transfer once a placement has been accepted. REX has asked Deya LEMUS to f/u with the 4 pending SNF's locally. REX also pulled another list within 100 miles of home address. REX has been unsuccessful contacting Kristen goodrich. Nursing will notify REX if she appears bedside.    Plan: REX will remain available for dc planning

## 2018-03-07 NOTE — DISCHARGE PLANNING
Medical Social Work    SW called pt's dtr, Kristen, to discuss d/c plan and to see if she has contacted [facility]. VM box has not been set up. SW will try to reach Kristen throughout the day.

## 2018-03-08 NOTE — PROGRESS NOTES
Received report from night shift RN. Assumed care of patient. Pt assessed and stable. VSS. Pt calm and in no signs of pain.Family at bedside. Call light within reach, strip alarm active, bed in low position.

## 2018-03-08 NOTE — DISCHARGE PLANNING
Medical Social Work    Referral:  Rounds    Intervention:  Pt discussed during rounds. Pt is awaiting placement in SNF with Hospice.  Pt will not meet GIP guidelines.  4 pending SNF's to be followed up on.    Plan:  SW will remain available for dc planning

## 2018-03-08 NOTE — PROGRESS NOTES
Received report from day shift RN. Assumed care of patient. Pt shows no s/sx of distress. Pt is nonverbal at this time and comfort care status Discussed plan of care for day with family and received verbal understanding. Call light within reach, strip alarm active, bed in low position.

## 2018-03-08 NOTE — DISCHARGE PLANNING
Spoke with Deepika at [facility]. Per Deepika, they do not [place] where [facility] is located, however. Per Deepkia, she will continue to follow patient pending plan.  Received choice form from Mariely(REX). Referral sent to [facility].

## 2018-03-08 NOTE — CARE PLAN
Problem: Pain Management  Goal: Pain level will decrease to patient's comfort goal    Intervention: Follow pain managment plan developed in collaboration with patient and Interdisciplinary Team  Pt receiving PRN pain meds to keep pain at a tolerable level per doctor's orders      Problem: Skin Integrity  Goal: Risk for impaired skin integrity will decrease    Intervention: Assess and monitor skin integrity, appearance and/or temperature  Pt skin integrity is being maintained by being turned q2h

## 2018-03-08 NOTE — CARE PLAN
Problem: Pain Management  Goal: Pain level will decrease to patient's comfort goal  Outcome: PROGRESSING AS EXPECTED      Problem: Skin Integrity  Goal: Risk for impaired skin integrity will decrease  Outcome: PROGRESSING SLOWER THAN EXPECTED

## 2018-03-08 NOTE — PROGRESS NOTES
[facility] Hospitalist Progress Note    Date of Service: 3/8/2018    Chief Complaint  83 y.o. 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    Interval Problem Update  Remains non verbal  Awake and tracking  Appears comfortable    Consultants/Specialty  Neurology  Palliative care    Disposition  SNF with hospice bed when accepted        Review of Systems   Unable to perform ROS: Patient nonverbal      Physical Exam  Laboratory/Imaging   Hemodynamics  Temp (24hrs), Av.5 °C (97.7 °F), Min:36.4 °C (97.6 °F), Max:36.6 °C (97.8 °F)   Temperature: 36.4 °C (97.6 °F)  Pulse  Av.7  Min: 54  Max: 114   Blood Pressure : 149/74      Respiratory      Respiration: 18, Pulse Oximetry: 88 %        RUL Breath Sounds: Diminished, RML Breath Sounds: Diminished, RLL Breath Sounds: Diminished, MIRYAM Breath Sounds: Diminished, LLL Breath Sounds: Diminished    Fluids    Intake/Output Summary (Last 24 hours) at 18 1150  Last data filed at 18 0300   Gross per 24 hour   Intake                0 ml   Output              300 ml   Net             -300 ml       Nutrition  Orders Placed This Encounter   Procedures   • DIET ORDER     Standing Status:   Standing     Number of Occurrences:   1     Order Specific Question:   Diet:     Answer:   Full Liquid [11]     Comments:   adat to pt preference   exam unchanged from yesterday  Physical Exam   Constitutional: No distress.   HENT:   Mouth/Throat: No oropharyngeal exudate.   Eyes: Right eye exhibits no discharge. Left eye exhibits no discharge. No scleral icterus.   Neck: Normal range of motion. Neck supple. No tracheal deviation present.   Cardiovascular: Normal rate, regular rhythm, normal heart sounds and intact distal pulses.  Exam reveals no gallop and no friction rub.    No murmur heard.  Pulmonary/Chest: Effort normal and breath sounds normal. No stridor. No respiratory distress. He has no wheezes. He has no rales. He exhibits no tenderness.    Abdominal: Soft. Bowel sounds are normal. He exhibits no distension and no mass. There is no tenderness. There is no rebound and no guarding.   Musculoskeletal: He exhibits no edema, tenderness or deformity.   Neurological: No cranial nerve deficit. He exhibits normal muscle tone. Coordination normal.   Does not follow commands, tracks movement at times.    Skin: Skin is warm and dry. No rash noted. He is not diaphoretic. No erythema. No pallor.   Nursing note and vitals reviewed.                               Assessment/Plan     * Stroke (CMS-HCC)   Assessment & Plan    CT with left sided parietal and temporal lobe stroke  On comfort care  [facility]        Comfort measures only status   Overview           Assessment & Plan              COPD (chronic obstructive pulmonary disease) (CMS-HCC)- (present on admission)   Assessment & Plan    not in acute exacerbation currently              Quality-Core Measures   Reviewed items::  Labs reviewed, Radiology images reviewed and Medications reviewed  Barnett catheter::  No Barnett  DVT prophylaxis pharmacological::  Heparin  DVT prophylaxis - mechanical:  SCDs

## 2018-03-09 NOTE — DISCHARGE PLANNING
Followed up with [facility]. Spoke to Claudia who states they do accept patients on hospice, however, patient needs a payer source as insurance only covers hospice services and not room and board. [facility] does not have hospice bed available, however, could accept patient on private pay.  Attempted to follow up with [facility] and [facility], however, no answer. Voicemail left.

## 2018-03-09 NOTE — DISCHARGE PLANNING
Medical Social Work    Referral:  Hospice    Intervention:  SW met with ENZO Rea from [facility]. It pt is placed locally they will be happy to accept pt on service. ENZO can be reached through the weekend at ([phone])    Plan:  [facility] remain available for dc planning

## 2018-03-09 NOTE — PROGRESS NOTES
Received report from day shift RN. Assumed care of patient. Pt shows no s/sx of distress. Pt comfort care and nonverbal at this time. Family at bedside. Call light within reach, strip alarm active, bed in low position.

## 2018-03-09 NOTE — PROGRESS NOTES
Received report from night shift RN. Assumed care of patient. Pt assessed and stable. VSS. Pt asleep with unlabored breathing. Family at beside- also sleeping. Call light within reach, strip alarm active, bed in low position.

## 2018-03-09 NOTE — CARE PLAN
Problem: Discharge Barriers/Planning  Goal: Patient's continuum of care needs will be met    Intervention: Collaborate with Transitional Care Team and Interdisciplinary Team to meet discharge needs  Working with CM for placement. Pt com      Problem: Pain Management  Goal: Pain level will decrease to patient's comfort goal    Intervention: Follow pain managment plan developed in collaboration with patient and Interdisciplinary Team  Pt receiving PRN pain meds to keep pain at a tolerable level per doctor's orders

## 2018-03-09 NOTE — CARE PLAN
Problem: Psychosocial needs  Goal: Anxiety reduction    Intervention: Encourage support system participation  Family at bedside      Problem: Pain  Goal: Alleviation of Pain or a reduction in pain to the patient's comfort goal    Intervention: Pain Management--Medications  Pt receiving PRN pain meds to keep pain at a tolerable level per doctor's orders

## 2018-03-09 NOTE — PROGRESS NOTES
[facility] Hospitalist Progress Note    Date of Service: 3/9/2018    Chief Complaint  83 y.o. 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    Interval Problem Update  No significant change  Remains alert, tracking but non verbal  Appears comfortable  Daughter reportedly arriving in town today, close family friend remains at bedside    Consultants/Specialty  Neurology  Palliative care    Disposition  SNF with hospice bed when accepted        Review of Systems   Unable to perform ROS: Patient nonverbal      Physical Exam  Laboratory/Imaging   Hemodynamics  Temp (24hrs), Av.5 °C (97.7 °F), Min:36.5 °C (97.7 °F), Max:36.5 °C (97.7 °F)   Temperature: 36.5 °C (97.7 °F)  Pulse  Av.8  Min: 54  Max: 114   Blood Pressure : 153/82      Respiratory      Respiration: 18, Pulse Oximetry: 92 %        RUL Breath Sounds: Diminished, RML Breath Sounds: Diminished, RLL Breath Sounds: Diminished, MIRYAM Breath Sounds: Diminished, LLL Breath Sounds: Diminished    Fluids    Intake/Output Summary (Last 24 hours) at 18 1147  Last data filed at 18 0400   Gross per 24 hour   Intake                0 ml   Output              200 ml   Net             -200 ml       Nutrition  Orders Placed This Encounter   Procedures   • DIET ORDER     Standing Status:   Standing     Number of Occurrences:   1     Order Specific Question:   Diet:     Answer:   Full Liquid [11]     Comments:   adat to pt preference   exam unchanged from yesterday  Physical Exam   Constitutional: No distress.   HENT:   Mouth/Throat: No oropharyngeal exudate.   Eyes: Right eye exhibits no discharge. Left eye exhibits no discharge. No scleral icterus.   Neck: Normal range of motion. Neck supple. No tracheal deviation present.   Cardiovascular: Normal rate, regular rhythm, normal heart sounds and intact distal pulses.  Exam reveals no gallop and no friction rub.    No murmur heard.  Pulmonary/Chest: Effort normal and breath sounds normal.  No stridor. No respiratory distress. He has no wheezes. He has no rales. He exhibits no tenderness.   Abdominal: Soft. Bowel sounds are normal. He exhibits no distension and no mass. There is no tenderness. There is no rebound and no guarding.   Musculoskeletal: He exhibits no edema, tenderness or deformity.   Neurological: No cranial nerve deficit. He exhibits normal muscle tone. Coordination normal.   Does not follow commands, tracks movement at times.    Skin: Skin is warm and dry. No rash noted. He is not diaphoretic. No erythema. No pallor.   Nursing note and vitals reviewed.                               Assessment/Plan     * Stroke (CMS-HCC)   Assessment & Plan    CT with left sided parietal and temporal lobe stroke  On comfort care  Awaiting [facility]        Comfort measures only status   Overview           Assessment & Plan              COPD (chronic obstructive pulmonary disease) (CMS-HCC)- (present on admission)   Assessment & Plan    not in acute exacerbation currently              Quality-Core Measures   Reviewed items::  Labs reviewed, Radiology images reviewed and Medications reviewed  Barnett catheter::  No Barnett  DVT prophylaxis pharmacological::  Heparin  DVT prophylaxis - mechanical:  SCDs

## 2018-03-09 NOTE — WOUND TEAM
[facility]  Inpatient Services  Initial Wound and Skin Care Evaluation    Admission Date:   02/23/2018  HPI, PMH, SH: Reviewed  Unit where seen by Wound Team: Neurosurgery.    WOUND CONSULT RELATED TO: Heels, buttocks.    SUBJECTIVE:  Patient alert, nonverbal, not following.     Self Report / Pain Level: No indications of pain. Ticklish, withdrew feet when assessing the heels.     OBJECTIVE: Family at bedside.     WOUND TYPE, LOCATION, CHARACTERISTICS (Pressure ulcers: location, stage, POA or date identified)    Location and type of wound: Left heel, medial: Stage 2 pressure injury. 03/08/2018.        Periwound:     Blanching erythema.   Drainage:     None.  Tissue Type and %:    100% clear fluid filled bullae.   Wound Edges:    Attached.  Odor:      None.  Exposed structure(s):  None.  S&S of Infection:   None.      Measurements:   03/09/2018  Length:     2.1 cm  Width:      3.2 cm  Depth:         Location and type of wound: Lower sacrum, medial buttocks/gluteal cleft: Incontinence Associated Dermatitis (IAD).        Periwound:     Intact.  Drainage:     Minimal, serosanguinous.  Tissue Type and %:    30% red denuded tissue, 20% callous, 50% red macerated.  Wound Edges:    Attached  Odor:      Slightly malodorous.  Exposed structure(s):  None.  S&S of Infection:   None.      Measurements:   03/09/2018.  Length:     7 cm  Width:      8.3 cm  Depth:     0.2 cm    INTERVENTIONS BY WOUND TEAM: Bilateral heels assessed first. Right heel has blanching erythema. Left heel with a clear fluid filled bullae, a stage 2 pressure injury. Patient turned and posterior assessed. Lower sacrum, medial buttocks, inside gluteal cleft has MAD/IAD with areas of denuded tissue. This was cleansed with barrier wipes. Patient repositioned with pillow under left hip. Heels floated on pillow. Nursing had already ordered a low air loss bed, this RN ordered zinc barrier cream and placed skin care order.       Interdisciplinary  consultation:  Patient, patient's family, DANNI Stevenson.    EVALUATION: Low air loss bed circulates air under patient, which will help to dry overly moist skin. Zinc barrier cream is soothing and drying. Offloading pressure from bony prominences will allow perfusion to areas so that wound will heal and prevent further breakdown.     Factors affecting wound healing: CVA, comfort care, pressure, moisture.   Goals: Prevent worsening of wounds and further breakdown.     NURSING PLAN OF CARE ORDERS (X):    Dressing changes: See Dressing Maintenance orders:   Skin care: See Skin Care orders: XX  Rectal tube care: See Rectal Tube Care orders:   Other orders:    RSKIN: CURRENT (X) ORDERED (O)  Q shift Dhruv:  X  Q shift pressure point assessments:  X  Pressure redistribution mattress X with waffle overlay.       RAMON    O  Bariatric RAMON      Bariatric foam        Heel float boots       Heels floated on pillows    X  Barrier wipes      Barrier Cream    X  Barrier paste      Sacral silicone dressing      Silicone O2 tubing   X   Anchorfast      Trach with Optifoam split foam       Waffle cushion      Rectal tube or BMS      Antifungal tx    Turn q 2 hours  X   Up to chair     Ambulate   PT/OT     Dietician      PO  X   TF   TPN     PVN    NPO   # days   Other       WOUND TEAM PLAN OF CARE (X):   NPWT change 3 x week:        Dressing changes by wound team:       Follow up as needed:   X weekly.    Other (explain):    Anticipated discharge plans (X):  SNF:   X        Home Care:           Outpatient Wound Center:            Self Care:            Other:

## 2018-03-10 NOTE — CARE PLAN
Problem: Safety  Goal: Will remain free from injury  Outcome: PROGRESSING AS EXPECTED      Problem: Infection  Goal: Will remain free from infection  Outcome: PROGRESSING AS EXPECTED      Problem: Safety:  Goal: Will remain free from injury  Outcome: PROGRESSING AS EXPECTED

## 2018-03-10 NOTE — PROGRESS NOTES
Assumed care of pt from PM RN. Pt resting quietly. No s/s distress. Pt able to wiggle toes on right foot on comand. q2 turns in place. Heels floated.

## 2018-03-10 NOTE — PROGRESS NOTES
Pt resting in bed, continues with comfort care. No pain to note at this time. No medications scheduled, continues with q2 turns and oral care provided. Will continue to monitor.

## 2018-03-10 NOTE — CARE PLAN
Problem: Communication  Goal: The ability to communicate needs accurately and effectively will improve  Outcome: PROGRESSING AS EXPECTED  Pt able to make needs known.    Problem: Safety  Goal: Will remain free from injury  Outcome: PROGRESSING AS EXPECTED  Pt call light within reach and uses when needing help with any cares.     Problem: Safety:  Goal: Will remain free from injury  Outcome: PROGRESSING AS EXPECTED  Pt call light within reach and uses when needing help with any cares.

## 2018-03-10 NOTE — PROGRESS NOTES
[facility] Hospitalist Progress Note    Date of Service: 3/10/2018    Chief Complaint  83 y.o. 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    Interval Problem Update  Unchanged, still appears comfortable  [facility]  Remains alert, tracking   Remains non verbal    Consultants/Specialty  Neurology  Palliative care    Disposition  SNF with hospice bed when accepted        Review of Systems   Unable to perform ROS: Patient nonverbal      Physical Exam  Laboratory/Imaging   Hemodynamics  Temp (24hrs), Av.2 °C (97.2 °F), Min:36.1 °C (96.9 °F), Max:36.4 °C (97.5 °F)   Temperature: 36.4 °C (97.5 °F)  Pulse  Av.5  Min: 54  Max: 114   Blood Pressure : 110/56      Respiratory      Respiration: 18, Pulse Oximetry: (!) 82 %        RUL Breath Sounds: Clear, RML Breath Sounds: Clear, RLL Breath Sounds: Diminished, MIRYAM Breath Sounds: Clear, LLL Breath Sounds: Diminished    Fluids    Intake/Output Summary (Last 24 hours) at 03/10/18 1215  Last data filed at 03/10/18 0500   Gross per 24 hour   Intake                0 ml   Output              200 ml   Net             -200 ml       Nutrition  Orders Placed This Encounter   Procedures   • DIET ORDER     Standing Status:   Standing     Number of Occurrences:   1     Order Specific Question:   Diet:     Answer:   Full Liquid [11]     Comments:   adat to pt preference   exam unchanged from yesterday  Physical Exam   Constitutional: No distress.   HENT:   Mouth/Throat: No oropharyngeal exudate.   Eyes: Right eye exhibits no discharge. Left eye exhibits no discharge. No scleral icterus.   Neck: Normal range of motion. Neck supple. No tracheal deviation present.   Cardiovascular: Normal rate, regular rhythm, normal heart sounds and intact distal pulses.  Exam reveals no gallop and no friction rub.    No murmur heard.  Pulmonary/Chest: Effort normal and breath sounds normal. No stridor. No respiratory distress. He has no wheezes.  He has no rales. He exhibits no tenderness.   Abdominal: Soft. Bowel sounds are normal. He exhibits no distension and no mass. There is no tenderness. There is no rebound and no guarding.   Musculoskeletal: He exhibits no edema, tenderness or deformity.   Neurological: No cranial nerve deficit. He exhibits normal muscle tone. Coordination normal.   Does not follow commands, tracks movement at times.    Skin: Skin is warm and dry. No rash noted. He is not diaphoretic. No erythema. No pallor.   Nursing note and vitals reviewed.                               Assessment/Plan     * Stroke (CMS-HCC)   Assessment & Plan    CT with left sided parietal and temporal lobe stroke  On comfort care  Awaiting [facility]        Comfort measures only status   Overview           Assessment & Plan              COPD (chronic obstructive pulmonary disease) (CMS-HCC)- (present on admission)   Assessment & Plan    not in acute exacerbation currently              Quality-Core Measures   Reviewed items::  Labs reviewed, Radiology images reviewed and Medications reviewed  Barnett catheter::  No Barnett  DVT prophylaxis pharmacological::  Heparin  DVT prophylaxis - mechanical:  SCDs

## 2018-03-11 NOTE — DISCHARGE SUMMARY
Death Summary    Cause of Death  Cerebral vascular accident, prostate cancer, hypertension, chronic obstructive pulmonary disease,    History of chronic nicotine use.  Comorbid Conditions at the Time of Death  Principal Problem:    Stroke (CMS-HCC) POA: Unknown  Active Problems:    Comfort measures only status POA: Unknown      Overview:           Essential hypertension, benign POA: Yes    COPD (chronic obstructive pulmonary disease) (CMS-HCC) POA: Yes      Overview: Chronic smoker x 65 yrs  Resolved Problems:    * No resolved hospital problems. *      History of Presenting Illness and Hospital Course  This is a 83 y.o. male admitted 2/23/2018 with a previous stroke who presented to [facility] with acute right sided weakness.  He was transferred here for a neurology consult.  He was found to have and acute left MCA stroke and was not a candidate for TPA or a thrombectomy.    Patient was non verbal throughout his entire hospital stay and prognosis was guarded.  Per his family's wishes he was placed on comfort care and he passed on 3/11/18 at approximately 6:43 am.    DX-CHEST-LIMITED (1 VIEW)   Final Result      Hazy right basilar opacity is improved compared to prior.      Left basilar opacity may represent atelectasis or pneumonitis.      Mild interstitial prominence may represent mild edema.            MR-BRAIN-W/O   Final Result      1.  Acute infarcts in the right frontal and parietal lobes.   2.  Subacute infarcts in the left temporal and parietal lobes.   3.  Cortical based petechial hemorrhages in the bilateral frontal, left temporal and parietal lobes likely secondary to the coexistent amyloid angiopathy.. Chronic infarcts in the bilateral basal ganglia.   4.  Diffuse T2 hyperintensity in the supratentorial brain parenchyma and kandis consistent with chronic microvascular ischemic disease and or Amyloid angiopathy.   5.  Severe chronic atherosclerotic disease in the M1 segment of the left middle  cerebral artery.   6.  Moderate to severe cerebral atrophy.      Carotid Duplex ([facility] [place] [facility] Only) - Do not order if CTA - Neck done in ER   Final Result      Echocardiogram Comp W/O cont   Final Result      DX-ABDOMEN FOR TUBE PLACEMENT   Final Result      Enteric tube projects over the stomach.      DX-ABDOMEN FOR TUBE PLACEMENT   Final Result      Feeding tube looped within the stomach with tip at the fundus.      DX-CHEST-PORTABLE (1 VIEW)   Final Result         1. No acute cardiopulmonary abnormalities are identified.          Death Date: 03/11/18   Death Time: 0643

## 2018-03-11 NOTE — PROGRESS NOTES
Unable to assess pt's mental status.  Moves all extremities 1/5.   Pt shows signs of pain with tachycardia and tachypnea, morphine and Ativan given PRN.  Condom cath in place  Bed alarm on.  Call light within reach, bed in lowest position.

## 2018-03-20 NOTE — ADDENDUM NOTE
Encounter addended by: Vianey Tamayo on: 3/20/2018  2:54 AM<BR>    Actions taken: Pend clinical note

## 2018-03-20 NOTE — DOCUMENTATION QUERY
"DOCUMENTATION QUERY    PROVIDERS: Please select “Cosign w/ note”to reply to query.    To better represent the severity of illness of your patient, please review the following information and exercise your independent professional judgment in responding to this query.     Patient presented with a L MCA stroke. MRI was performed to rule out cerebral edema. Findings included, \"Cortical based petechial hemorrhages in the bilateral frontal, left temporal and parietal lobes likely secondary to the coexistent amyloid angiopathy.. Chronic infarcts in the bilateral basal ganglia.\" A condition from an abnormal report cannot be coded without a physician's documentation of the clinical significance of the findings.    Based on clinical findings, risk factors and treatment, can these cortical based petechial hemorrhages be further specified as    1. Cortical based petechial hemorrhages are clinically significant  2. Cortical based petechial hemorrhages are clinically insignificant  3. Cortical based petechial hemorrhages are an unconfirmed condition  4. Other explanation of clinical presentation (please document)  5. Unable to determine      The medical record reflects the following:   Clinical Findings  L MCA stroke   Petechial hemorrhage with amyloid?   Treatment  Comfort Care   Risk Factors     Location within medical record  History and Physical, Progress Notes, Discharge Summary, Radiology Results and Consult Notes     Thank you,   Vianey Tamayo          "

## 2018-03-22 NOTE — ADDENDUM NOTE
Encounter addended by: Vianey Tamayo on: 3/22/2018  2:19 AM<BR>    Actions taken: Sign clinical note

## 2020-08-06 NOTE — DISCHARGE PLANNING
Medical Social Work    Referral: F/U    Intervention:  After re-reading previous notes. [facility] assessment is still pending. Would like to f/u with [facility] as current pursuit of SNF with Hospice has not been successful to this point. Deya LEMUS will f/u with outstanding SNF acceptance.  CHOICE for [facility] faxed to Deya LEMUS as [facility] declined pt.    Plan:  SW will remain available for dc planning   [place] Border Text: The closure involved the vermilion border.
